# Patient Record
Sex: MALE | Race: WHITE | NOT HISPANIC OR LATINO | Employment: FULL TIME | URBAN - METROPOLITAN AREA
[De-identification: names, ages, dates, MRNs, and addresses within clinical notes are randomized per-mention and may not be internally consistent; named-entity substitution may affect disease eponyms.]

---

## 2017-01-20 ENCOUNTER — OFFICE VISIT (OUTPATIENT)
Dept: CARDIOLOGY | Facility: MEDICAL CENTER | Age: 62
End: 2017-01-20
Payer: COMMERCIAL

## 2017-01-20 VITALS
BODY MASS INDEX: 41.86 KG/M2 | HEART RATE: 64 BPM | WEIGHT: 299 LBS | OXYGEN SATURATION: 91 % | DIASTOLIC BLOOD PRESSURE: 80 MMHG | HEIGHT: 71 IN | SYSTOLIC BLOOD PRESSURE: 130 MMHG

## 2017-01-20 DIAGNOSIS — I25.84 CORONARY ARTERY DISEASE DUE TO CALCIFIED CORONARY LESION: ICD-10-CM

## 2017-01-20 DIAGNOSIS — I25.10 CORONARY ARTERY DISEASE DUE TO CALCIFIED CORONARY LESION: ICD-10-CM

## 2017-01-20 DIAGNOSIS — I10 ESSENTIAL HYPERTENSION, BENIGN: ICD-10-CM

## 2017-01-20 DIAGNOSIS — E78.2 MIXED HYPERLIPIDEMIA: ICD-10-CM

## 2017-01-20 PROCEDURE — 99214 OFFICE O/P EST MOD 30 MIN: CPT | Performed by: INTERNAL MEDICINE

## 2017-01-20 NOTE — PROGRESS NOTES
"Subjective:   Vitor Wilks is a 61 y.o. male who presents today for followup of his coronary artery disease with a drug-eluting stent placed in the circumflex for a non-ST segment elevation myocardial infarction in February 2014. He also has hypertension and hyperlipidemia.     His blood pressures at home and running approximately 120-130/70-80. He has had no chest discomfort, dyspnea, edema, palpitations or lightheadedness.    He's walking for about 30-45 minutes 3 days weekly without difficulty.          Past Medical History   Diagnosis Date   • Heart attack 02/17/     No past surgical history on file.  Family History   Problem Relation Age of Onset   • Stroke Father 60     likely TIA     History   Smoking status   • Former Smoker -- 0.50 packs/day for 10 years   • Types: Cigarettes   • Quit date: 02/28/2008   Smokeless tobacco   • Never Used     No Known Allergies  Outpatient Encounter Prescriptions as of 1/20/2017   Medication Sig Dispense Refill   • lisinopril (PRINIVIL) 5 MG Tab Take 1 Tab by mouth every day. 90 Tab 3   • rosuvastatin (CRESTOR) 40 MG tablet Take 1 Tab by mouth every day. 90 Tab 3   • metoprolol SR (TOPROL XL) 50 MG TABLET SR 24 HR Take 1 Tab by mouth every bedtime. 90 Tab 3   • aspirin (ASA) 325 MG TABS Take 325 mg by mouth every day.       No facility-administered encounter medications on file as of 1/20/2017.     ROS       Objective:   /80 mmHg  Pulse 64  Ht 1.803 m (5' 10.98\")  Wt 135.626 kg (299 lb)  BMI 41.72 kg/m2  SpO2 91%    Physical Exam   Neck: No JVD present.   Cardiovascular: Normal rate and regular rhythm.  Exam reveals no gallop.    No murmur heard.  Pulmonary/Chest: Effort normal. He has no rales.   Abdominal: Soft. There is no tenderness.   Musculoskeletal: He exhibits no edema.     Lab Results   Component Value Date/Time    CHOLESTEROL, 10/12/2016 08:48 AM    LDL 55 10/12/2016 08:48 AM    HDL 42 10/12/2016 08:48 AM    TRIGLYCERIDES 219* 10/12/2016 08:48 AM "       Lab Results   Component Value Date/Time    SODIUM 139 07/19/2016 09:58 AM    POTASSIUM 4.9 07/19/2016 09:58 AM    CHLORIDE 97 07/19/2016 09:58 AM    CO2 24 07/19/2016 09:58 AM    GLUCOSE 146* 07/19/2016 09:58 AM    BUN 15 07/19/2016 09:58 AM    CREATININE 0.78 07/19/2016 09:58 AM    BUN-CREATININE RATIO 19 07/19/2016 09:58 AM     Lab Results   Component Value Date/Time    ALKALINE PHOSPHATASE 60 10/12/2016 08:48 AM    AST(SGOT) 41* 10/12/2016 08:48 AM    ALT(SGPT) 57* 10/12/2016 08:48 AM    TOTAL BILIRUBIN 0.4 10/12/2016 08:48 AM      Lab Results   Component Value Date/Time    B NATRIURETIC PEPTIDE 15 02/17/2014 04:07 PM          Assessment:     1. Coronary artery disease due to calcified coronary lesion: MARYCARMEN to the circumflex in 2014     2. Mixed hyperlipidemia     3. Essential hypertension, benign         Medical Decision Making:  Today's Assessment / Status / Plan:     Mr. Wilks is clinically stable. He is asymptomatic from a cardiovascular standpoint. We did discuss increasing his aerobic conditioning. He does need to be at least mildly dyspneic when he goes for his walks. In addition, I would like him to get an activity tracker and walk at least 10,000 steps daily. We also discussed the fact that his BMI is in the morbidly obese range and we need to get that down. His blood pressure appears to be under excellent control as is his lipid status. He will follow-up in 6 months with repeat lab work. In addition, he plans to obtain lab work today.

## 2017-01-20 NOTE — MR AVS SNAPSHOT
"        Vitor Wilks   2017 7:45 AM   Office Visit   MRN: [id number]    Department:  [facility]   Dept Phone:  [phone]    Description:  Male : 1955   Provider:  Sai Alexandra M.D.           Reason for Visit     Follow-Up           Allergies as of 2017     No Known Allergies      You were diagnosed with     Coronary artery disease due to calcified coronary lesion   [6504530]       Mixed hyperlipidemia   [272.2.ICD-9-CM]       Essential hypertension, benign   [401.1.ICD-9-CM]         Vital Signs     Blood Pressure Pulse Height Weight Body Mass Index Oxygen Saturation    130/80 mmHg 64 1.803 m (5' 10.98\") 135.626 kg (299 lb) 41.72 kg/m2 91%    Smoking Status                   Former Smoker           Basic Information     Date Of Birth Sex Race Ethnicity Preferred Language    1955 Male White Non- English      Problem List              ICD-10-CM Priority Class Noted - Resolved    NSTEMI (non-ST elevated myocardial infarction) (CMS-[facility]) I21.4 High  2014 - Present    Coronary artery disease due to calcified coronary lesion: MARYCARMEN to the circumflex in  I25.10, I25.84   2014 - Present    Obesity E66.9   2014 - Present    Mixed hyperlipidemia E78.2   2014 - Present    S/P coronary artery stent placement Z95.5   2014 - Present    Essential hypertension, benign I10   2014 - Present    Research study patient Z00.6   2016 - Present      Health Maintenance        Date Due Completion Dates    IMM DTaP/Tdap/Td Vaccine (1 - Tdap) 10/5/1973 ---    COLONOSCOPY 10/5/2004 ---    IMM ZOSTER VACCINE 10/5/2014 ---    IMM INFLUENZA (1) 2016 ---            Current Immunizations     No immunizations on file.      Below and/or attached are the medications your provider expects you to take. Review all of your home medications and newly ordered medications with your provider and/or pharmacist. Follow medication instructions as directed by your provider " and/or pharmacist. Please keep your medication list with you and share with your provider. Update the information when medications are discontinued, doses are changed, or new medications (including over-the-counter products) are added; and carry medication information at all times in the event of emergency situations     Allergies:  No Known Allergies          Medications  Valid as of: January 20, 2017 -  8:45 AM    Generic Name Brand Name Tablet Size Instructions for use    Aspirin (Tab)  MG Take 325 mg by mouth every day.        Lisinopril (Tab) PRINIVIL 5 MG Take 1 Tab by mouth every day.        Metoprolol Succinate (TABLET SR 24 HR) TOPROL XL 50 MG Take 1 Tab by mouth every bedtime.        Rosuvastatin Calcium (Tab) CRESTOR 40 MG Take 1 Tab by mouth every day.        .                 Medicines prescribed today were sent to:     [facility] [address] [place] [address] [place] - [address] [facility]    [address] [facility] [address]    Phone: [phone] Fax: [phone]    Open 24 Hours?: No      Medication refill instructions:       If your prescription bottle indicates you have medication refills left, it is not necessary to call your provider’s office. Please contact your pharmacy and they will refill your medication.    If your prescription bottle indicates you do not have any refills left, you may request refills at any time through one of the following ways: The online [employer] system (except Urgent Care), by calling your provider’s office, or by asking your pharmacy to contact your provider’s office with a refill request. Medication refills are processed only during regular business hours and may not be available until the next business day. Your provider may request additional information or to have a follow-up visit with you prior to refilling your medication.   *Please Note: Medication refills are assigned a new Rx number when refilled electronically. Your pharmacy may indicate that no  refills were authorized even though a new prescription for the same medication is available at the pharmacy. Please request the medicine by name with the pharmacy before contacting your provider for a refill.        Your To Do List     Future Labs/Procedures Complete By Expires    COMP METABOLIC PANEL  As directed 1/20/2018    LIPID PROFILE  As directed 1/20/2018         [employer] Access Code: [email or web address]  Expires: 2/9/2017 10:39 AM    [employer]  A secure, online tool to manage your health information     [employer]’s [employer]® is a secure, online tool that connects you to your personalized health information from the privacy of your home -- day or night - making it very easy for you to manage your healthcare. Once the activation process is completed, you can even access your medical information using the [employer] giovanni, which is available for free in the Apple Giovanni store or Google Play store.     [employer] provides the following levels of access (as shown below):   My Chart Features   [facility] Primary Care Doctor [facility]  Specialists [facility]  Urgent  [facility]  Primary Care  Doctor   Email your healthcare team securely and privately 24/7 X X X    Manage appointments: schedule your next appointment; view details of past/upcoming appointments X      Request prescription refills. X      View recent personal medical records, including lab and immunizations X X X X   View health record, including health history, allergies, medications X X X X   Read reports about your outpatient visits, procedures, consult and ER notes X X X X   See your discharge summary, which is a recap of your hospital and/or ER visit that includes your diagnosis, lab results, and care plan. X X       How to register for [employer]:  1. Go to  [email or web address].  2. Click on the Sign Up Now box, which takes you to the New Member Sign Up page. You will need to provide the following information:  a. Enter your [employer] Access Code exactly as it  appears at the top of this page. (You will not need to use this code after you’ve completed the sign-up process. If you do not sign up before the expiration date, you must request a new code.)   b. Enter your date of birth.   c. Enter your home email address.   d. Click Submit, and follow the next screen’s instructions.  3. Create a [employer] ID. This will be your [employer] login ID and cannot be changed, so think of one that is secure and easy to remember.  4. Create a [employer] password. You can change your password at any time.  5. Enter your Password Reset Question and Answer. This can be used at a later time if you forget your password.   6. Enter your e-mail address. This allows you to receive e-mail notifications when new information is available in [employer].  7. Click Sign Up. You can now view your health information.    For assistance activating your [employer] account, call [phone]

## 2017-01-20 NOTE — Clinical Note
"     [facility]   [address]  [place] [address]  Phone: [phone]  Fax: [phone]              Vitor Wilks  1955    Encounter Date: 1/20/2017    Sai Alexandra M.D.          PROGRESS NOTE:  Subjective:   Vitor Wilks is a 61 y.o. male who presents today for followup of his coronary artery disease with a drug-eluting stent placed in the circumflex for a non-ST segment elevation myocardial infarction in February 2014. He also has hypertension and hyperlipidemia.     His blood pressures at home and running approximately 120-130/70-80. He has had no chest discomfort, dyspnea, edema, palpitations or lightheadedness.    He's walking for about 30-45 minutes 3 days weekly without difficulty.          Past Medical History   Diagnosis Date   • Heart attack 02/17/     No past surgical history on file.  Family History   Problem Relation Age of Onset   • Stroke Father 60     likely TIA     History   Smoking status   • Former Smoker -- 0.50 packs/day for 10 years   • Types: Cigarettes   • Quit date: 02/28/2008   Smokeless tobacco   • Never Used     No Known Allergies  Outpatient Encounter Prescriptions as of 1/20/2017   Medication Sig Dispense Refill   • lisinopril (PRINIVIL) 5 MG Tab Take 1 Tab by mouth every day. 90 Tab 3   • rosuvastatin (CRESTOR) 40 MG tablet Take 1 Tab by mouth every day. 90 Tab 3   • metoprolol SR (TOPROL XL) 50 MG TABLET SR 24 HR Take 1 Tab by mouth every bedtime. 90 Tab 3   • aspirin (ASA) 325 MG TABS Take 325 mg by mouth every day.       No facility-administered encounter medications on file as of 1/20/2017.     ROS       Objective:   /80 mmHg  Pulse 64  Ht 1.803 m (5' 10.98\")  Wt 135.626 kg (299 lb)  BMI 41.72 kg/m2  SpO2 91%    Physical Exam   Neck: No JVD present.   Cardiovascular: Normal rate and regular rhythm.  Exam reveals no gallop.    No murmur heard.  Pulmonary/Chest: Effort normal. He has no rales.  "   Abdominal: Soft. There is no tenderness.   Musculoskeletal: He exhibits no edema.     Lab Results   Component Value Date/Time    CHOLESTEROL, 10/12/2016 08:48 AM    LDL 55 10/12/2016 08:48 AM    HDL 42 10/12/2016 08:48 AM    TRIGLYCERIDES 219* 10/12/2016 08:48 AM       Lab Results   Component Value Date/Time    SODIUM 139 07/19/2016 09:58 AM    POTASSIUM 4.9 07/19/2016 09:58 AM    CHLORIDE 97 07/19/2016 09:58 AM    CO2 24 07/19/2016 09:58 AM    GLUCOSE 146* 07/19/2016 09:58 AM    BUN 15 07/19/2016 09:58 AM    CREATININE 0.78 07/19/2016 09:58 AM    BUN-CREATININE RATIO 19 07/19/2016 09:58 AM     Lab Results   Component Value Date/Time    ALKALINE PHOSPHATASE 60 10/12/2016 08:48 AM    AST(SGOT) 41* 10/12/2016 08:48 AM    ALT(SGPT) 57* 10/12/2016 08:48 AM    TOTAL BILIRUBIN 0.4 10/12/2016 08:48 AM      Lab Results   Component Value Date/Time    B NATRIURETIC PEPTIDE 15 02/17/2014 04:07 PM          Assessment:     1. Coronary artery disease due to calcified coronary lesion: MARYCARMEN to the circumflex in 2014     2. Mixed hyperlipidemia     3. Essential hypertension, benign         Medical Decision Making:  Today's Assessment / Status / Plan:     Mr. Wilks is clinically stable. He is asymptomatic from a cardiovascular standpoint. We did discuss increasing his aerobic conditioning. He does need to be at least mildly dyspneic when he goes for his walks. In addition, I would like him to get an activity tracker and walk at least 10,000 steps daily. We also discussed the fact that his BMI is in the morbidly obese range and we need to get that down. His blood pressure appears to be under excellent control as is his lipid status. He will follow-up in 6 months with repeat lab work. In addition, he plans to obtain lab work today.      Jose Miguel Evans M.D.  [address]  [address]  [facility] [address]  VIA In Basket

## 2017-01-21 LAB
ALBUMIN SERPL-MCNC: 4.4 G/DL (ref 3.6–4.8)
ALBUMIN/GLOB SERPL: 1.8 {RATIO} (ref 1.1–2.5)
ALP SERPL-CCNC: 56 IU/L (ref 39–117)
ALT SERPL-CCNC: 34 IU/L (ref 0–44)
AST SERPL-CCNC: 26 IU/L (ref 0–40)
BILIRUB SERPL-MCNC: 0.6 MG/DL (ref 0–1.2)
BUN SERPL-MCNC: 14 MG/DL (ref 8–27)
BUN/CREAT SERPL: 15 (ref 10–22)
CALCIUM SERPL-MCNC: 9.3 MG/DL (ref 8.6–10.2)
CHLORIDE SERPL-SCNC: 101 MMOL/L (ref 96–106)
CHOLEST SERPL-MCNC: 120 MG/DL (ref 100–199)
CO2 SERPL-SCNC: 20 MMOL/L (ref 18–29)
COMMENT 011824: NORMAL
CREAT SERPL-MCNC: 0.91 MG/DL (ref 0.76–1.27)
GLOBULIN SER CALC-MCNC: 2.5 G/DL (ref 1.5–4.5)
GLUCOSE SERPL-MCNC: 208 MG/DL (ref 65–99)
HDLC SERPL-MCNC: 43 MG/DL
LDLC SERPL CALC-MCNC: 50 MG/DL (ref 0–99)
POTASSIUM SERPL-SCNC: 4.6 MMOL/L (ref 3.5–5.2)
PROT SERPL-MCNC: 6.9 G/DL (ref 6–8.5)
SODIUM SERPL-SCNC: 138 MMOL/L (ref 134–144)
TRIGL SERPL-MCNC: 133 MG/DL (ref 0–149)
VLDLC SERPL CALC-MCNC: 27 MG/DL (ref 5–40)

## 2017-01-24 ENCOUNTER — TELEPHONE (OUTPATIENT)
Dept: CARDIOLOGY | Facility: MEDICAL CENTER | Age: 62
End: 2017-01-24

## 2017-01-24 DIAGNOSIS — I10 ESSENTIAL HYPERTENSION: ICD-10-CM

## 2017-01-24 DIAGNOSIS — I25.2 HISTORY OF NON-ST ELEVATION MYOCARDIAL INFARCTION (NSTEMI): ICD-10-CM

## 2017-01-24 RX ORDER — METOPROLOL SUCCINATE 50 MG/1
50 TABLET, EXTENDED RELEASE ORAL
Qty: 90 TAB | Refills: 3 | OUTPATIENT
Start: 2017-01-24 | End: 2018-01-29 | Stop reason: SDUPTHER

## 2017-01-24 NOTE — TELEPHONE ENCOUNTER
Patient was scheduled to come in for lab work for the Cirt Study on 1/23/17 @0900. Patient no showed. I called patient 1/24/17 @

## 2017-01-27 ENCOUNTER — NON-PROVIDER VISIT (OUTPATIENT)
Dept: VASCULAR LAB | Facility: MEDICAL CENTER | Age: 62
End: 2017-01-27

## 2017-01-27 NOTE — NON-PROVIDER
Saw Joseph today for the CIRT study pre visit 4.7 lab draw. Labs were drawn and processed according to protocol. I gave him the participant fall 2016 newsletter. Next in office study visit with fasting lab and medication dispense is scheduled for 2/13/2017 at 0800. I will call him next week after we receive his safety labs and go over study questions.    Patient mentioned at this visit when he took his first open label MTX he had an upset stomach with it. When he took the 2 tablets the next Saturday he was fine and has not had any issues with it since that first dose. We have asked at every visit how he was doing and if he was having any side effects or any problems with taking the study drug, he has always said no. I told him if he ever has any problems or symptoms with taking the study drug to let us know right away. He is not having any problems, up set stomach or other issues as of today. He said it was just that one time.    The visit window for this visit was 1/10/17 to 1/24/17.This visit is outside of the visit window as he had to reschedule the appt on 1/18/17 and didn't show for appt 1/23/17.

## 2017-02-07 ENCOUNTER — TELEPHONE (OUTPATIENT)
Dept: CARDIOLOGY | Facility: MEDICAL CENTER | Age: 62
End: 2017-02-07

## 2017-02-07 NOTE — TELEPHONE ENCOUNTER
----- Message from Sai Alexandra M.D. sent at 2/6/2017  6:08 AM PST -----  Needs fu with PCP if pt was fasting at time of lab. Increased BS.

## 2017-02-07 NOTE — TELEPHONE ENCOUNTER
Called patient, he states that he was fasting for his lab draw. He is aware of his lab results and has already scheduled an appointment with SANGITA GARZA on 3/24/2017 to discuss his elevated blood glucose.    ROYCE HURT

## 2017-02-13 ENCOUNTER — TELEPHONE (OUTPATIENT)
Dept: ADMINISTRATIVE | Facility: MEDICAL CENTER | Age: 62
End: 2017-02-13

## 2017-02-13 NOTE — TELEPHONE ENCOUNTER
Patient in for CIRT study Visit 5, fasting lab, BP and weight.  Protocol assessments completed and study drug usage reviewed.  Labs processed per protocol. New study medication pack dispensed.  Joseph had no complaints or problems with study drug or compliance.  Once safety labs received, I will call him to complete study visit.  Next appointment made for 3/13/17.

## 2017-03-20 ENCOUNTER — NON-PROVIDER VISIT (OUTPATIENT)
Dept: VASCULAR LAB | Facility: MEDICAL CENTER | Age: 62
End: 2017-03-20

## 2017-03-20 NOTE — NON-PROVIDER
[facility] Blood was drawn and processed according to protocol. Study questions were answered, eCRF was filled out. I will call Joseph in 2-3 days when safety labs are completed and titration algorithm will be run at that time. He is currently on 20 mg of study drug every Saturday. He has had no problems with taking the SD. No upcoming procedures are scheduled, no hospitalizations and no new diagnoses. Next blood draw is scheduled for April 10 at 0900.

## 2017-03-22 PROBLEM — Z00.6 ENCOUNTER FOR EXAMINATION FOR NORMAL COMPARISON AND CONTROL IN CLINICAL RESEARCH PROGRAM: Status: ACTIVE | Noted: 2017-03-22

## 2017-03-24 ENCOUNTER — OFFICE VISIT (OUTPATIENT)
Dept: MEDICAL GROUP | Facility: PHYSICIAN GROUP | Age: 62
End: 2017-03-24
Payer: COMMERCIAL

## 2017-03-24 VITALS
BODY MASS INDEX: 41.3 KG/M2 | SYSTOLIC BLOOD PRESSURE: 122 MMHG | DIASTOLIC BLOOD PRESSURE: 80 MMHG | OXYGEN SATURATION: 94 % | HEIGHT: 71 IN | WEIGHT: 295 LBS | TEMPERATURE: 97.9 F | HEART RATE: 67 BPM | RESPIRATION RATE: 16 BRPM

## 2017-03-24 DIAGNOSIS — Z00.00 HEALTHCARE MAINTENANCE: ICD-10-CM

## 2017-03-24 DIAGNOSIS — R73.09 ELEVATED GLUCOSE: ICD-10-CM

## 2017-03-24 DIAGNOSIS — Z00.6 RESEARCH STUDY PATIENT: ICD-10-CM

## 2017-03-24 DIAGNOSIS — I21.4 NSTEMI (NON-ST ELEVATED MYOCARDIAL INFARCTION) (HCC): ICD-10-CM

## 2017-03-24 DIAGNOSIS — Z12.11 SCREEN FOR COLON CANCER: ICD-10-CM

## 2017-03-24 DIAGNOSIS — E78.2 MIXED HYPERLIPIDEMIA: ICD-10-CM

## 2017-03-24 DIAGNOSIS — I10 ESSENTIAL HYPERTENSION, BENIGN: ICD-10-CM

## 2017-03-24 DIAGNOSIS — Z95.5 S/P CORONARY ARTERY STENT PLACEMENT: ICD-10-CM

## 2017-03-24 PROCEDURE — 99214 OFFICE O/P EST MOD 30 MIN: CPT | Performed by: NURSE PRACTITIONER

## 2017-03-24 ASSESSMENT — PATIENT HEALTH QUESTIONNAIRE - PHQ9: CLINICAL INTERPRETATION OF PHQ2 SCORE: 0

## 2017-03-24 ASSESSMENT — PAIN SCALES - GENERAL: PAINLEVEL: NO PAIN

## 2017-03-24 NOTE — ASSESSMENT & PLAN NOTE
As noted this is a 61-year-old male who is here to establish care, he has known coronary artery disease and status post stent placement in 2014. This is managed by his cardiologist Dr. Alexandra. He has no chest pain symptoms.

## 2017-03-24 NOTE — ASSESSMENT & PLAN NOTE
As noted patient is a current research study participant under the direction of Dr. Bailey. This is a randomized, double-blind, placebo-controlled trial of low-dose methotrexate and the prevention of cardiovascular events among stable coronary artery disease patients with type 2 diabetes or metabolic syndrome. In addition to methotrexate  Vs placebo he takes folic acid daily. He follows up with the research team monthly with labs.

## 2017-03-24 NOTE — ASSESSMENT & PLAN NOTE
This is a 61-year-old male with known coronary artery disease, history is post coronary artery stent placement in 2014. He is currently followed closely by cardiology who manages his lisinopril, metoprolol, Crestor. Most recent lipid panel was normal and reviewed with the patient. He did have a fasting CMP which showed a glucose in the 200s, he was directed to PCP for follow-up and management.

## 2017-03-24 NOTE — ASSESSMENT & PLAN NOTE
Patient is due for colonoscopy, we discussed colonoscopy versus FIT, patient prefers FIT. He does not have family history of colon cancer, he denies rectal pain, hematochezia, rectal bleeding.     As noted patient is a double-blind research study participant, it is unknown if he is on methotrexate or placebo, he is not a candidate for shingles vaccine at this time.

## 2017-03-24 NOTE — ASSESSMENT & PLAN NOTE
As noted this is a 61-year-old male with known coronary artery disease, on recent fasting labs his glucose was elevated in the 200s and he was asked to follow-up with PCP for diagnosis and management. He did test a fasting glucose this morning and he tells me that it was in the mid 200s but he cannot recall the specific breathing. He denies polyuria, polydipsia, polyphagia. He is morbidly obese. He does have a family history of diabetes. We discussed A1c and starting metformin 500 mg twice daily with meals, we discussed the adverse effects this medication. He will follow up in 3 months, sooner if needed.

## 2017-03-24 NOTE — ASSESSMENT & PLAN NOTE
This is a chronic condition which is well controlled on medications. Patient is tolerating medications without side effects.  Patient continues to follow up with cardiology.

## 2017-03-24 NOTE — PROGRESS NOTES
[facility]  Primary Care Office Visit - Problem-Oriented        History:     Vitor Wilks is a 61 y.o. male who is here today to discuss New Patient    S/P coronary artery stent placement  This is a 61-year-old male with known coronary artery disease, history is post coronary artery stent placement in 2014. He is currently followed closely by cardiology who manages his lisinopril, metoprolol, Crestor. Most recent lipid panel was normal and reviewed with the patient. He did have a fasting CMP which showed a glucose in the 200s, he was directed to PCP for follow-up and management.    Elevated glucose  As noted this is a 61-year-old male with known coronary artery disease, on recent fasting labs his glucose was elevated in the 200s and he was asked to follow-up with PCP for diagnosis and management. He did test a fasting glucose this morning and he tells me that it was in the mid 200s but he cannot recall the specific breathing. He denies polyuria, polydipsia, polyphagia. He is morbidly obese. He does have a family history of diabetes. We discussed A1c and starting metformin 500 mg twice daily with meals, we discussed the adverse effects this medication. He will follow up in 3 months, sooner if needed.       NSTEMI (non-ST elevated myocardial infarction)  As noted this is a 61-year-old male who is here to establish care, he has known coronary artery disease and status post stent placement in 2014. This is managed by his cardiologist Dr. Alexandra. He has no chest pain symptoms.    Essential hypertension, benign  This is a chronic condition which is well controlled on medications. Patient is tolerating medications without side effects.  Patient continues to follow up with cardiology.      Mixed hyperlipidemia  This is a chronic condition which is well controlled on medications. Patient is tolerating medications without side effects.      Research study patient  As noted patient is a current research  study participant under the direction of Dr. Bailey. This is a randomized, double-blind, placebo-controlled trial of low-dose methotrexate and the prevention of cardiovascular events among stable coronary artery disease patients with type 2 diabetes or metabolic syndrome. In addition to methotrexate  Vs placebo he takes folic acid daily. He follows up with the research team monthly with labs.      Healthcare maintenance  Patient is due for colonoscopy, we discussed colonoscopy versus FIT, patient prefers FIT. He does not have family history of colon cancer, he denies rectal pain, hematochezia, rectal bleeding.     As noted patient is a double-blind research study participant, it is unknown if he is on methotrexate or placebo, he is not a candidate for shingles vaccine at this time.              Past Medical History   Diagnosis Date   • Heart attack (CMS-HCC) 02/17/     History reviewed. No pertinent past surgical history.  Social History     Social History   • Marital Status:      Spouse Name: N/A   • Number of Children: N/A   • Years of Education: N/A     Occupational History   • Not on file.     Social History Main Topics   • Smoking status: Former Smoker -- 0.50 packs/day for 10 years     Types: Cigarettes     Quit date: 02/28/2008   • Smokeless tobacco: Never Used   • Alcohol Use: No   • Drug Use: No   • Sexual Activity: Not on file     Other Topics Concern   • Not on file     Social History Narrative     History   Smoking status   • Former Smoker -- 0.50 packs/day for 10 years   • Types: Cigarettes   • Quit date: 02/28/2008   Smokeless tobacco   • Never Used     Family History   Problem Relation Age of Onset   • Stroke Father 60     likely TIA     No Known Allergies    Problem List:     Patient Active Problem List    Diagnosis Date Noted   • NSTEMI (non-ST elevated myocardial infarction) (CMS-HCC) 02/18/2014     Priority: High   • Elevated glucose 03/24/2017   • Healthcare maintenance 03/24/2017  "  • Encounter for examination for normal comparison and control in clinical research program 03/22/2017   • Research study patient 07/29/2016   • Mixed hyperlipidemia 02/28/2014   • S/P coronary artery stent placement 02/28/2014   • Essential hypertension, benign 02/28/2014   • Coronary artery disease due to calcified coronary lesion: MARYCARMEN to the circumflex in 2014 02/18/2014   • Obesity 02/18/2014         Medications:     Current outpatient prescriptions:   •  METHOTREXATE, ANTI-RHEUMATIC, PO, Take  by mouth., Disp: , Rfl:   •  metformin (GLUCOPHAGE) 500 MG Tab, Take 1 Tab by mouth 2 times a day, with meals., Disp: 60 Tab, Rfl: 2  •  metoprolol SR (TOPROL XL) 50 MG TABLET SR 24 HR, Take 1 Tab by mouth every bedtime., Disp: 90 Tab, Rfl: 3  •  lisinopril (PRINIVIL) 5 MG Tab, Take 1 Tab by mouth every day., Disp: 90 Tab, Rfl: 3  •  rosuvastatin (CRESTOR) 40 MG tablet, Take 1 Tab by mouth every day., Disp: 90 Tab, Rfl: 3  •  aspirin (ASA) 325 MG TABS, Take 325 mg by mouth every day., Disp: , Rfl:       Review of Systems:     Comprehensive review of systems negative.      Physical Assessment:     VS: /80 mmHg  Pulse 67  Temp(Src) 36.6 °C (97.9 °F)  Resp 16  Ht 1.803 m (5' 10.98\")  Wt 133.811 kg (295 lb)  BMI 41.16 kg/m2  SpO2 94%    General: Well-developed, well-nourished, male     Head: PERRL, EOMI. Normocephalic. No facial asymmetry noted.  Cardiovasc:No JVD.  RRR, no MRG. No thrills or bruits. Pulses 2+ and symmetric at all distal extremities.  Pulmonary: Lungs clear bilaterally.  Normal respiratory effort. No wheeze or crackles.   Extremities: No edema, no TTP bilateral calves. Pedal pulses intact. No joint effusions. LEs warm and well-perfused.  Neuro: Alert and oriented  Skin:No rashes noted. Skin warm, dry, intact    Psych: Dressed appropriately for the weather, pleasant and conversant.  Affect, mood & judgment appropriate.      Assessment/Plan:   Vitor was seen today for new patient.    Diagnoses " and all orders for this visit:    Elevated glucose, new, uncontrolled   - We'll evaluate for diabetes with A1c, given recurrent elevated fasting glucose readings will start metformin 500 mg twice daily with meals. We discussed adverse effect this medication. We discussed diet and exercise. He will follow up in 3 months, sooner if needed.  -     HEMOGLOBIN A1C; Future  -     metformin (GLUCOPHAGE) 500 MG Tab; Take 1 Tab by mouth 2 times a day, with meals.    Screen for colon cancer  -     OCCULT BLOOD FECES IMMUNOASSAY; Future    Essential hypertension, benign, chronic, stable   - managed by cardiology, patient continues on lisinopril and metoprolol.    Mixed hyperlipidemia, chronic, stable on crestor    Research study patient    Patient is agreeable to the above plan and voiced understanding. All questions answered.     Please note that this dictation was created using voice recognition software. I have made every reasonable attempt to correct obvious errors, but I expect that there are errors of grammar and possibly content that I did not discover before finalizing the note.      KARINA Mccoy  3/24/2017, 10:03 AM

## 2017-03-24 NOTE — MR AVS SNAPSHOT
"        Vitor Wilks   3/24/2017 9:40 AM   Office Visit   MRN: [id number]    Department:  [facility]   Dept Phone:  [phone]    Description:  Male : 1955   Provider:  LAY Davis           Reason for Visit     New Patient est care and glucose.       Allergies as of 3/24/2017     No Known Allergies      You were diagnosed with     Elevated glucose   [303032]       NSTEMI (non-ST elevated myocardial infarction) (CMS-[facility])   [697926]       Healthcare maintenance   [451631]       Screen for colon cancer   [957507]         Vital Signs     Blood Pressure Pulse Temperature Respirations Height Weight    122/80 mmHg 67 36.6 °C (97.9 °F) 16 1.803 m (5' 10.98\") 133.811 kg (295 lb)    Body Mass Index Oxygen Saturation Smoking Status             41.16 kg/m2 94% Former Smoker         Basic Information     Date Of Birth Sex Race Ethnicity Preferred Language    1955 Male White Non- English      Your appointments     Apr 10, 2017  9:00 AM   FOLLOW UP VISIT  - RESEARCH with CARDIOVASCULAR RESEARCH NURSE   [facility]  (--)    [address] [place] [address]   [phone]            2017  9:00 AM   Established Patient with LAY Davis   [address] [address] [phone]           You will be receiving a confirmation call a few days before your appointment from our automated call confirmation system.            2017  8:15 AM   FOLLOW UP with Sai Alexandra M.D.   [facility] (--)    [address]  [place] [address] [phone]              Problem List              ICD-10-CM Priority Class Noted - Resolved    NSTEMI (non-ST elevated myocardial infarction) (CMS-[facility]) I21.4 High  2014 - Present    Coronary artery disease due to calcified coronary lesion: MARYCARMEN to the " circumflex in 2014 I25.10, I25.84   2/18/2014 - Present    Obesity E66.9   2/18/2014 - Present    Mixed hyperlipidemia E78.2   2/28/2014 - Present    S/P coronary artery stent placement Z95.5   2/28/2014 - Present    Essential hypertension, benign I10   2/28/2014 - Present    Research study patient Z00.6   7/29/2016 - Present    Encounter for examination for normal comparison and control in clinical research program Z00.6   3/22/2017 - Present    Elevated glucose R73.09   3/24/2017 - Present    Healthcare maintenance Z00.00   3/24/2017 - Present      Health Maintenance        Date Due Completion Dates    IMM DTaP/Tdap/Td Vaccine (1 - Tdap) 10/5/1973 ---    COLONOSCOPY 10/5/2004 ---    IMM ZOSTER VACCINE 10/5/2014 ---    IMM INFLUENZA (1) 9/1/2016 ---            Current Immunizations     No immunizations on file.      Below and/or attached are the medications your provider expects you to take. Review all of your home medications and newly ordered medications with your provider and/or pharmacist. Follow medication instructions as directed by your provider and/or pharmacist. Please keep your medication list with you and share with your provider. Update the information when medications are discontinued, doses are changed, or new medications (including over-the-counter products) are added; and carry medication information at all times in the event of emergency situations     Allergies:  No Known Allergies          Medications  Valid as of: March 24, 2017 -  9:57 AM    Generic Name Brand Name Tablet Size Instructions for use    Aspirin (Tab)  MG Take 325 mg by mouth every day.        Lisinopril (Tab) PRINIVIL 5 MG Take 1 Tab by mouth every day.        MetFORMIN HCl (Tab) GLUCOPHAGE 500 MG Take 1 Tab by mouth 2 times a day, with meals.        Methotrexate (Anti-Rheumatic)   Take  by mouth.        Metoprolol Succinate (TABLET SR 24 HR) TOPROL XL 50 MG Take 1 Tab by mouth every bedtime.        Rosuvastatin Calcium  (Tab) CRESTOR 40 MG Take 1 Tab by mouth every day.        .                 Medicines prescribed today were sent to:     [facility] [place], [place] - [address] [address]    [address] [place] [address]    Phone: [phone] Fax: [phone]    Open 24 Hours?: No      Medication refill instructions:       If your prescription bottle indicates you have medication refills left, it is not necessary to call your provider’s office. Please contact your pharmacy and they will refill your medication.    If your prescription bottle indicates you do not have any refills left, you may request refills at any time through one of the following ways: The online Emgo system (except Urgent Care), by calling your provider’s office, or by asking your pharmacy to contact your provider’s office with a refill request. Medication refills are processed only during regular business hours and may not be available until the next business day. Your provider may request additional information or to have a follow-up visit with you prior to refilling your medication.   *Please Note: Medication refills are assigned a new Rx number when refilled electronically. Your pharmacy may indicate that no refills were authorized even though a new prescription for the same medication is available at the pharmacy. Please request the medicine by name with the pharmacy before contacting your provider for a refill.        Your To Do List     Future Labs/Procedures Complete By Expires    HEMOGLOBIN A1C  As directed 3/25/2018    OCCULT BLOOD FECES IMMUNOASSAY  As directed 3/25/2018         Emgo Access Code: Activation code not generated  Current Emgo Status: Active

## 2017-04-10 ENCOUNTER — NON-PROVIDER VISIT (OUTPATIENT)
Dept: VASCULAR LAB | Facility: MEDICAL CENTER | Age: 62
End: 2017-04-10

## 2017-04-10 NOTE — NON-PROVIDER
Saw Joseph for the CIRT study pre-visit 5.6. We margaret lab work and processed according to protocol. He is currently on 4 tablets of SD every Saturday. He is not taking extra folic acid. I will call him when safety labs are received in 2-3 days, go through study questions at that time and run titration algorithm.

## 2017-04-15 ENCOUNTER — HOSPITAL ENCOUNTER (OUTPATIENT)
Facility: MEDICAL CENTER | Age: 62
End: 2017-04-15
Attending: NURSE PRACTITIONER
Payer: COMMERCIAL

## 2017-04-15 PROCEDURE — 82274 ASSAY TEST FOR BLOOD FECAL: CPT

## 2017-04-19 ENCOUNTER — TELEPHONE (OUTPATIENT)
Dept: MEDICAL GROUP | Facility: PHYSICIAN GROUP | Age: 62
End: 2017-04-19

## 2017-04-19 DIAGNOSIS — E11.9 TYPE 2 DIABETES MELLITUS WITHOUT COMPLICATION, WITHOUT LONG-TERM CURRENT USE OF INSULIN (HCC): ICD-10-CM

## 2017-04-19 DIAGNOSIS — Z12.11 SCREEN FOR COLON CANCER: ICD-10-CM

## 2017-04-19 LAB — HBA1C MFR BLD: 8.4 % (ref 4.8–5.6)

## 2017-04-19 NOTE — TELEPHONE ENCOUNTER
Please call the patient. He needs to increase his metformin to 1000mg (2 tabs) twice daily with meals and start a new medication once daily with breakfast for A1c 8.4%. We will repeat A1c in mid July. Keep follow up. Rx sent to pharmacy, updated metformin Rx also at pharmacy.

## 2017-04-20 LAB — HEMOCCULT STL QL IA: NEGATIVE

## 2017-06-02 ENCOUNTER — OFFICE VISIT (OUTPATIENT)
Dept: URGENT CARE | Facility: PHYSICIAN GROUP | Age: 62
End: 2017-06-02
Payer: COMMERCIAL

## 2017-06-02 VITALS
WEIGHT: 293 LBS | OXYGEN SATURATION: 98 % | SYSTOLIC BLOOD PRESSURE: 124 MMHG | TEMPERATURE: 96.7 F | RESPIRATION RATE: 14 BRPM | BODY MASS INDEX: 40.88 KG/M2 | HEART RATE: 86 BPM | DIASTOLIC BLOOD PRESSURE: 82 MMHG

## 2017-06-02 DIAGNOSIS — J20.9 ACUTE BRONCHITIS, UNSPECIFIED ORGANISM: ICD-10-CM

## 2017-06-02 DIAGNOSIS — J02.9 PHARYNGITIS, UNSPECIFIED ETIOLOGY: ICD-10-CM

## 2017-06-02 DIAGNOSIS — I10 ESSENTIAL HYPERTENSION: ICD-10-CM

## 2017-06-02 DIAGNOSIS — E11.9 TYPE 2 DIABETES MELLITUS WITHOUT COMPLICATION, WITHOUT LONG-TERM CURRENT USE OF INSULIN (HCC): ICD-10-CM

## 2017-06-02 PROCEDURE — 99214 OFFICE O/P EST MOD 30 MIN: CPT | Performed by: FAMILY MEDICINE

## 2017-06-02 RX ORDER — AMOXICILLIN AND CLAVULANATE POTASSIUM 875; 125 MG/1; MG/1
1 TABLET, FILM COATED ORAL 2 TIMES DAILY
Qty: 20 TAB | Refills: 0 | Status: SHIPPED | OUTPATIENT
Start: 2017-06-02 | End: 2017-06-12

## 2017-06-02 NOTE — PROGRESS NOTES
HPI: Vitor Wilks is a 61 y.o. male who presents with   Chief Complaint   Patient presents with   • URI     Cold Sx      Patient has had sore throat nasal congestion and a cough for several days. He denies fevers or chills his had some fatigue. No nausea vomiting or diarrhea. He is a diabetic his physicians are trying the works to improve his levels. He states that he took a fingerstick blood sugar yesterday which was 140.  Worsened by: activity, laying supine at night, first thing in the morning, when exposed to outside allergens  Improved by: OTC symptomatic medictions      PMH:  has a past medical history of Heart attack (CMS-[facility]) (02/17/).  MEDS:   Current outpatient prescriptions:   •  metformin (GLUCOPHAGE) 1000 MG tablet, Take 1 Tab by mouth 2 times a day, with meals., Disp: 60 Tab, Rfl: 11  •  METHOTREXATE, ANTI-RHEUMATIC, PO, Take  by mouth., Disp: , Rfl:   •  metoprolol SR (TOPROL XL) 50 MG TABLET SR 24 HR, Take 1 Tab by mouth every bedtime., Disp: 90 Tab, Rfl: 3  •  lisinopril (PRINIVIL) 5 MG Tab, Take 1 Tab by mouth every day., Disp: 90 Tab, Rfl: 3  •  rosuvastatin (CRESTOR) 40 MG tablet, Take 1 Tab by mouth every day., Disp: 90 Tab, Rfl: 3  •  aspirin (ASA) 325 MG TABS, Take 325 mg by mouth every day., Disp: , Rfl:   •  sitagliptin (JANUVIA) 100 MG Tab, Take 1 Tab by mouth every day., Disp: 30 Tab, Rfl: 11  ALLERGIES: No Known Allergies  SURGHX: History reviewed. No pertinent past surgical history.  SOCHX:  reports that he quit smoking about 9 years ago. His smoking use included Cigarettes. He has a 5 pack-year smoking history. He has never used smokeless tobacco. He reports that he does not drink alcohol or use illicit drugs.  FH: Family history was reviewed, no pertinent findings to report    PE:  Vitals /82 mmHg  Pulse 86  Temp(Src) 35.9 °C (96.7 °F)  Resp 14  Wt 132.904 kg (293 lb)  SpO2 98%   Gen AOx4, NAD  HEENT: moist mucus membranes, no pain or pressure with percussion of  frontal, maxillary or ethmoid sinuses.  Bilateral conjunciva clear without erythema or exudate,  Right tm with erythema, left tm clear without bulge, fluid or loss of landmarks, mild pharyngeal erythema without tonsillar exudate or tonsillar enlargement  Neck: supple, no cervical lymphadenopathy, no signs of menigismus  CV/PULM: RRR no murmurs, no rales but ronchi and expiratory wheezes are present, no signs of resp distress  Abd soft nontender, bs present  Skin no rashes  Extremities -c/c/e  Neuro appropriate affect,     A/P  1. Acute bronchitis, unspecified organism  amoxicillin-clavulanate (AUGMENTIN) 875-125 MG Tab   2. Pharyngitis, unspecified etiology  amoxicillin-clavulanate (AUGMENTIN) 875-125 MG Tab   3. Type 2 diabetes mellitus without complication, without long-term current use of insulin (CMS-[facility])     4. Essential hypertension       Follow-up with primary care provider within 4-5 days, emergency room precautions discussed.  Patient and/or family appears understanding of information.

## 2017-06-02 NOTE — MR AVS SNAPSHOT
Vitor Wilks   2017 9:40 AM   Office Visit   MRN: [id number]    Department:  [facility]   Dept Phone:  [phone]    Description:  Male : 1955   Provider:  Lou uRtledge D.O.           Reason for Visit     URI Cold Sx      Allergies as of 2017     No Known Allergies      You were diagnosed with     Acute bronchitis, unspecified organism   [8641592]       Pharyngitis, unspecified etiology   [3399822]       Type 2 diabetes mellitus without complication, without long-term current use of insulin (CMS-[facility])   [0170662]       Essential hypertension   [9149982]         Vital Signs     Blood Pressure Pulse Temperature Respirations Weight Oxygen Saturation    124/82 mmHg 86 35.9 °C (96.7 °F) 14 132.904 kg (293 lb) 98%    Smoking Status                   Former Smoker           Basic Information     Date Of Birth Sex Race Ethnicity Preferred Language    1955 Male White Non- English      Your appointments     2017  8:00 AM   FOLLOW UP VISIT  - RESEARCH with CARDIOVASCULAR RESEARCH NURSE   [facility]  (--)    [address] [facility] [address]   [phone]            2017  9:00 AM   Established Patient with LAY Davis   [address] [address] [phone]           You will be receiving a confirmation call a few days before your appointment from our automated call confirmation system.            2017  8:15 AM   FOLLOW UP with Sai Alexandra M.D.   [facility] (--)    [address] [place] [id number] [phone]            2017  1:00 PM   Established Patient with Radha Parada M.D.   [address] [address] [phone]           You will be receiving a confirmation call a few  days before your appointment from our automated call confirmation system.              Problem List              ICD-10-CM Priority Class Noted - Resolved    NSTEMI (non-ST elevated myocardial infarction) (CMS-HCC) I21.4 High  2/18/2014 - Present    Coronary artery disease due to calcified coronary lesion: MARYCARMEN to the circumflex in 2014 I25.10, I25.84   2/18/2014 - Present    Obesity E66.9   2/18/2014 - Present    Mixed hyperlipidemia E78.2   2/28/2014 - Present    S/P coronary artery stent placement Z95.5   2/28/2014 - Present    Essential hypertension, benign I10   2/28/2014 - Present    Research study patient Z00.6   7/29/2016 - Present    Encounter for examination for normal comparison and control in clinical research program Z00.6   3/22/2017 - Present    Elevated glucose R73.09   3/24/2017 - Present    Healthcare maintenance Z00.00   3/24/2017 - Present      Health Maintenance        Date Due Completion Dates    IMM DTaP/Tdap/Td Vaccine (1 - Tdap) 10/5/1973 ---    COLONOSCOPY 10/5/2004 ---    IMM ZOSTER VACCINE 10/5/2014 ---            Current Immunizations     No immunizations on file.      Below and/or attached are the medications your provider expects you to take. Review all of your home medications and newly ordered medications with your provider and/or pharmacist. Follow medication instructions as directed by your provider and/or pharmacist. Please keep your medication list with you and share with your provider. Update the information when medications are discontinued, doses are changed, or new medications (including over-the-counter products) are added; and carry medication information at all times in the event of emergency situations     Allergies:  No Known Allergies          Medications  Valid as of: June 02, 2017 - 10:12 AM    Generic Name Brand Name Tablet Size Instructions for use    Amoxicillin-Pot Clavulanate (Tab) AUGMENTIN 875-125 MG Take 1 Tab by mouth 2 times a day for 10 days. With food           Aspirin (Tab)  MG Take 325 mg by mouth every day.        Lisinopril (Tab) PRINIVIL 5 MG Take 1 Tab by mouth every day.        MetFORMIN HCl (Tab) GLUCOPHAGE 1000 MG Take 1 Tab by mouth 2 times a day, with meals.        Methotrexate (Anti-Rheumatic)   Take  by mouth.        Metoprolol Succinate (TABLET SR 24 HR) TOPROL XL 50 MG Take 1 Tab by mouth every bedtime.        Rosuvastatin Calcium (Tab) CRESTOR 40 MG Take 1 Tab by mouth every day.        SITagliptin Phosphate (Tab) JANUVIA 100 MG Take 1 Tab by mouth every day.        .                 Medicines prescribed today were sent to:     [facility] [address] [place] [address], [place] - [address] [facility]    [address] [place] [address]    Phone: [phone] Fax: [phone]    Open 24 Hours?: No      Medication refill instructions:       If your prescription bottle indicates you have medication refills left, it is not necessary to call your provider’s office. Please contact your pharmacy and they will refill your medication.    If your prescription bottle indicates you do not have any refills left, you may request refills at any time through one of the following ways: The online Trutap system (except Urgent Care), by calling your provider’s office, or by asking your pharmacy to contact your provider’s office with a refill request. Medication refills are processed only during regular business hours and may not be available until the next business day. Your provider may request additional information or to have a follow-up visit with you prior to refilling your medication.   *Please Note: Medication refills are assigned a new Rx number when refilled electronically. Your pharmacy may indicate that no refills were authorized even though a new prescription for the same medication is available at the pharmacy. Please request the medicine by name with the pharmacy before contacting your provider for a refill.           Trutap Access Code: Activation  code not generated  Current MyChart Status: Active

## 2017-06-12 ENCOUNTER — TELEPHONE (OUTPATIENT)
Dept: ADMINISTRATIVE | Facility: MEDICAL CENTER | Age: 62
End: 2017-06-12

## 2017-06-12 NOTE — TELEPHONE ENCOUNTER
Patient in today for Visit 6.  Assessments completed per protocol.  Fasting labs drawn via left AC, processed per protocol.  [facility] labs mailed via Inspirato. Patient has been compliant with study medications.  Reports a sore throat, prescribed Augmentin 6/2, last dose of antibiotic taken 6/12, states sore throat resolved.  Medication reconciliation completed.  Two new white label medication packs dispensed, one re-dispensed.  Patient will complete the study surveys online.  Will call the patient upon receiving safety labs to complete study visit.

## 2017-07-19 ENCOUNTER — OFFICE VISIT (OUTPATIENT)
Dept: CARDIOLOGY | Facility: MEDICAL CENTER | Age: 62
End: 2017-07-19
Payer: COMMERCIAL

## 2017-07-19 VITALS
OXYGEN SATURATION: 94 % | BODY MASS INDEX: 40.46 KG/M2 | HEART RATE: 61 BPM | HEIGHT: 71 IN | DIASTOLIC BLOOD PRESSURE: 72 MMHG | WEIGHT: 289 LBS | SYSTOLIC BLOOD PRESSURE: 100 MMHG

## 2017-07-19 DIAGNOSIS — E78.2 MIXED HYPERLIPIDEMIA: ICD-10-CM

## 2017-07-19 DIAGNOSIS — I25.84 CORONARY ARTERY DISEASE DUE TO CALCIFIED CORONARY LESION: ICD-10-CM

## 2017-07-19 DIAGNOSIS — I10 ESSENTIAL HYPERTENSION, BENIGN: ICD-10-CM

## 2017-07-19 DIAGNOSIS — I25.10 CORONARY ARTERY DISEASE DUE TO CALCIFIED CORONARY LESION: ICD-10-CM

## 2017-07-19 PROCEDURE — 99214 OFFICE O/P EST MOD 30 MIN: CPT | Performed by: INTERNAL MEDICINE

## 2017-07-19 NOTE — PROGRESS NOTES
"Subjective:   Vitor Wilks is a 61 y.o. male who presents today for followup of his coronary artery disease with a drug-eluting stent placed in the circumflex for a non-ST segment elevation myocardial infarction in February 2014. He also has hypertension and hyperlipidemia.     His blood pressures at home and running approximately 125/70.     He has had no chest discomfort, dyspnea, edema, palpitations or lightheadedness.    He's walking for about 60 minutes 5 days weekly without difficulty.          Past Medical History   Diagnosis Date   • Heart attack (CMS-[facility]) 02/17/     History reviewed. No pertinent past surgical history.  Family History   Problem Relation Age of Onset   • Stroke Father 60     likely TIA     History   Smoking status   • Former Smoker -- 0.50 packs/day for 10 years   • Types: Cigarettes   • Quit date: 02/28/2008   Smokeless tobacco   • Never Used     No Known Allergies  Outpatient Encounter Prescriptions as of 7/19/2017   Medication Sig Dispense Refill   • metformin (GLUCOPHAGE) 1000 MG tablet Take 1 Tab by mouth 2 times a day, with meals. 60 Tab 11   • METHOTREXATE, ANTI-RHEUMATIC, PO Take  by mouth.     • metoprolol SR (TOPROL XL) 50 MG TABLET SR 24 HR Take 1 Tab by mouth every bedtime. 90 Tab 3   • lisinopril (PRINIVIL) 5 MG Tab Take 1 Tab by mouth every day. 90 Tab 3   • rosuvastatin (CRESTOR) 40 MG tablet Take 1 Tab by mouth every day. 90 Tab 3   • aspirin (ASA) 325 MG TABS Take 325 mg by mouth every day.     • sitagliptin (JANUVIA) 100 MG Tab Take 1 Tab by mouth every day. 30 Tab 11     No facility-administered encounter medications on file as of 7/19/2017.     ROS       Objective:   /72 mmHg  Pulse 61  Ht 1.803 m (5' 10.98\")  Wt 131.09 kg (289 lb)  BMI 40.33 kg/m2  SpO2 94%    Physical Exam   Neck: No JVD present.   Cardiovascular: Normal rate and regular rhythm.  Exam reveals no gallop.    No murmur heard.  Pulmonary/Chest: Effort normal. He has no rales.   Abdominal: " Soft. There is no tenderness.   Musculoskeletal: He exhibits no edema.     Lab Results   Component Value Date/Time    CHOLESTEROL, 01/20/2017 09:26 AM    LDL 50 01/20/2017 09:26 AM    HDL 43 01/20/2017 09:26 AM    TRIGLYCERIDES 133 01/20/2017 09:26 AM       Lab Results   Component Value Date/Time    SODIUM 138 01/20/2017 09:26 AM    POTASSIUM 4.6 01/20/2017 09:26 AM    CHLORIDE 101 01/20/2017 09:26 AM    CO2 20 01/20/2017 09:26 AM    GLUCOSE 208* 01/20/2017 09:26 AM    BUN 14 01/20/2017 09:26 AM    CREATININE 0.91 01/20/2017 09:26 AM    BUN-CREATININE RATIO 15 01/20/2017 09:26 AM     Lab Results   Component Value Date/Time    ALKALINE PHOSPHATASE 56 01/20/2017 09:26 AM    AST(SGOT) 26 01/20/2017 09:26 AM    ALT(SGPT) 34 01/20/2017 09:26 AM    TOTAL BILIRUBIN 0.6 01/20/2017 09:26 AM      Lab Results   Component Value Date/Time    B NATRIURETIC PEPTIDE 15 02/17/2014 04:07 PM          Assessment:     1. Coronary artery disease due to calcified coronary lesion: MARYCARMEN to the circumflex in 2014     2. Mixed hyperlipidemia     3. Essential hypertension, benign         Medical Decision Making:  Today's Assessment / Status / Plan:     Mr. Wilks is clinically stable. He is losing weight and averaging more than 10,000 steps a day. He exercises 5 days a week and on those days gets about 16,000 steps. The other 2 days gets about 9000. His blood pressure is low and he will reduce metoprolol to 25 mg daily if it continues to be down around 100. His lipid status has been under good control and he is having repeat lab work done today. He will follow-up in about 6 months.

## 2017-07-19 NOTE — MR AVS SNAPSHOT
"        Vitor Wilks   2017 8:15 AM   Office Visit   MRN: [id number]    Department:  [facility]   Dept Phone:  [phone]    Description:  Male : 1955   Provider:  Sai Alexandra M.D.           Reason for Visit     Follow-Up           Allergies as of 2017     No Known Allergies      You were diagnosed with     Coronary artery disease due to calcified coronary lesion   [4033224]       Mixed hyperlipidemia   [272.2.ICD-9-CM]       Essential hypertension, benign   [401.1.ICD-9-CM]         Vital Signs     Blood Pressure Pulse Height Weight Body Mass Index Oxygen Saturation    100/72 mmHg 61 1.803 m (5' 10.98\") 131.09 kg (289 lb) 40.33 kg/m2 94%    Smoking Status                   Former Smoker           Basic Information     Date Of Birth Sex Race Ethnicity Preferred Language    1955 Male White Non- English      Your appointments     2017  9:40 AM   Established Patient with Radha Parada M.D.   [address] [address] [phone]           You will be receiving a confirmation call a few days before your appointment from our automated call confirmation system.              Problem List              ICD-10-CM Priority Class Noted - Resolved    NSTEMI (non-ST elevated myocardial infarction) (CMS-HCC) I21.4 High  2014 - Present    Coronary artery disease due to calcified coronary lesion: MARYCARMEN to the circumflex in  I25.10, I25.84   2014 - Present    Obesity E66.9   2014 - Present    Mixed hyperlipidemia E78.2   2014 - Present    S/P coronary artery stent placement Z95.5   2014 - Present    Essential hypertension, benign I10   2014 - Present    Research study patient Z00.6   2016 - Present    Encounter for examination for normal comparison and control in clinical research program Z00.6   3/22/2017 - Present    Elevated glucose R73.09   3/24/2017 - Present   " Healthcare maintenance Z00.00   3/24/2017 - Present      Health Maintenance        Date Due Completion Dates    DIABETES MONOFILAMENT / LE EXAM 4/5/1955 ---    RETINAL SCREENING 10/5/1972 ---    URINE ACR / MICROALBUMIN 10/5/1972 ---    IMM DTaP/Tdap/Td Vaccine (1 - Tdap) 10/5/1973 ---    COLONOSCOPY 10/5/2004 ---    IMM ZOSTER VACCINE 10/5/2014 ---    IMM INFLUENZA (1) 9/1/2017 ---    A1C SCREENING 10/18/2017 4/18/2017, 2/17/2014    FASTING LIPID PROFILE 1/20/2018 1/20/2017, 10/12/2016, 7/19/2016, 1/9/2016, 8/20/2014, 2/18/2014    SERUM CREATININE 1/20/2018 1/20/2017, 7/19/2016, 1/9/2016, 8/20/2014, 2/21/2014, 2/19/2014, 2/18/2014, 2/17/2014, 7/21/2006            Current Immunizations     No immunizations on file.      Below and/or attached are the medications your provider expects you to take. Review all of your home medications and newly ordered medications with your provider and/or pharmacist. Follow medication instructions as directed by your provider and/or pharmacist. Please keep your medication list with you and share with your provider. Update the information when medications are discontinued, doses are changed, or new medications (including over-the-counter products) are added; and carry medication information at all times in the event of emergency situations     Allergies:  No Known Allergies          Medications  Valid as of: July 19, 2017 -  8:55 AM    Generic Name Brand Name Tablet Size Instructions for use    Aspirin (Tab)  MG Take 325 mg by mouth every day.        Lisinopril (Tab) PRINIVIL 5 MG Take 1 Tab by mouth every day.        MetFORMIN HCl (Tab) GLUCOPHAGE 1000 MG Take 1 Tab by mouth 2 times a day, with meals.        Methotrexate (Anti-Rheumatic)   Take  by mouth.        Metoprolol Succinate (TABLET SR 24 HR) TOPROL XL 50 MG Take 1 Tab by mouth every bedtime.        Rosuvastatin Calcium (Tab) CRESTOR 40 MG Take 1 Tab by mouth every day.        SITagliptin Phosphate (Tab) JANUVIA 100 MG  Take 1 Tab by mouth every day.        .                 Medicines prescribed today were sent to:     [facility] [place], [place] - [address]    [address] [place] [address]    Phone: [phone] Fax: [phone]    Open 24 Hours?: No      Medication refill instructions:       If your prescription bottle indicates you have medication refills left, it is not necessary to call your provider’s office. Please contact your pharmacy and they will refill your medication.    If your prescription bottle indicates you do not have any refills left, you may request refills at any time through one of the following ways: The online [employer] system (except Urgent Care), by calling your provider’s office, or by asking your pharmacy to contact your provider’s office with a refill request. Medication refills are processed only during regular business hours and may not be available until the next business day. Your provider may request additional information or to have a follow-up visit with you prior to refilling your medication.   *Please Note: Medication refills are assigned a new Rx number when refilled electronically. Your pharmacy may indicate that no refills were authorized even though a new prescription for the same medication is available at the pharmacy. Please request the medicine by name with the pharmacy before contacting your provider for a refill.           [employer] Access Code: Activation code not generated  Current [employer] Status: Active

## 2017-07-19 NOTE — Clinical Note
[facility]   [address]  [place] [address]  Phone: [phone]  Fax: [phone]              Vitor Wilks  1955    Encounter Date: 7/19/2017    Sai Alexandra M.D.          PROGRESS NOTE:  Subjective:   Vitor Wilks is a 61 y.o. male who presents today for followup of his coronary artery disease with a drug-eluting stent placed in the circumflex for a non-ST segment elevation myocardial infarction in February 2014. He also has hypertension and hyperlipidemia.     His blood pressures at home and running approximately 125/70.     He has had no chest discomfort, dyspnea, edema, palpitations or lightheadedness.    He's walking for about 60 minutes 5 days weekly without difficulty.          Past Medical History   Diagnosis Date   • Heart attack (CMS-[facility]) 02/17/     History reviewed. No pertinent past surgical history.  Family History   Problem Relation Age of Onset   • Stroke Father 60     likely TIA     History   Smoking status   • Former Smoker -- 0.50 packs/day for 10 years   • Types: Cigarettes   • Quit date: 02/28/2008   Smokeless tobacco   • Never Used     No Known Allergies  Outpatient Encounter Prescriptions as of 7/19/2017   Medication Sig Dispense Refill   • metformin (GLUCOPHAGE) 1000 MG tablet Take 1 Tab by mouth 2 times a day, with meals. 60 Tab 11   • METHOTREXATE, ANTI-RHEUMATIC, PO Take  by mouth.     • metoprolol SR (TOPROL XL) 50 MG TABLET SR 24 HR Take 1 Tab by mouth every bedtime. 90 Tab 3   • lisinopril (PRINIVIL) 5 MG Tab Take 1 Tab by mouth every day. 90 Tab 3   • rosuvastatin (CRESTOR) 40 MG tablet Take 1 Tab by mouth every day. 90 Tab 3   • aspirin (ASA) 325 MG TABS Take 325 mg by mouth every day.     • sitagliptin (JANUVIA) 100 MG Tab Take 1 Tab by mouth every day. 30 Tab 11     No facility-administered encounter medications on file as of 7/19/2017.     ROS       Objective:   /72 mmHg  Pulse 61  Ht  "1.803 m (5' 10.98\")  Wt 131.09 kg (289 lb)  BMI 40.33 kg/m2  SpO2 94%    Physical Exam   Neck: No JVD present.   Cardiovascular: Normal rate and regular rhythm.  Exam reveals no gallop.    No murmur heard.  Pulmonary/Chest: Effort normal. He has no rales.   Abdominal: Soft. There is no tenderness.   Musculoskeletal: He exhibits no edema.     Lab Results   Component Value Date/Time    CHOLESTEROL, 01/20/2017 09:26 AM    LDL 50 01/20/2017 09:26 AM    HDL 43 01/20/2017 09:26 AM    TRIGLYCERIDES 133 01/20/2017 09:26 AM       Lab Results   Component Value Date/Time    SODIUM 138 01/20/2017 09:26 AM    POTASSIUM 4.6 01/20/2017 09:26 AM    CHLORIDE 101 01/20/2017 09:26 AM    CO2 20 01/20/2017 09:26 AM    GLUCOSE 208* 01/20/2017 09:26 AM    BUN 14 01/20/2017 09:26 AM    CREATININE 0.91 01/20/2017 09:26 AM    BUN-CREATININE RATIO 15 01/20/2017 09:26 AM     Lab Results   Component Value Date/Time    ALKALINE PHOSPHATASE 56 01/20/2017 09:26 AM    AST(SGOT) 26 01/20/2017 09:26 AM    ALT(SGPT) 34 01/20/2017 09:26 AM    TOTAL BILIRUBIN 0.6 01/20/2017 09:26 AM      Lab Results   Component Value Date/Time    B NATRIURETIC PEPTIDE 15 02/17/2014 04:07 PM          Assessment:     1. Coronary artery disease due to calcified coronary lesion: MARYCARMEN to the circumflex in 2014     2. Mixed hyperlipidemia     3. Essential hypertension, benign         Medical Decision Making:  Today's Assessment / Status / Plan:     Mr. Wilks is clinically stable. He is losing weight and averaging more than 10,000 steps a day. He exercises 5 days a week and on those days gets about 16,000 steps. The other 2 days gets about 9000. His blood pressure is low and he will reduce metoprolol to 25 mg daily if it continues to be down around 100. His lipid status has been under good control and he is having repeat lab work done today. He will follow-up in about 6 months.      Mariela Hollins, SANGITAPAngelaR.N.  [address] [address]  VIA In Basket         "

## 2017-07-20 LAB
ALBUMIN SERPL-MCNC: 4.5 G/DL (ref 3.6–4.8)
ALBUMIN/GLOB SERPL: 1.7 {RATIO} (ref 1.2–2.2)
ALP SERPL-CCNC: 46 IU/L (ref 39–117)
ALT SERPL-CCNC: 36 IU/L (ref 0–44)
AST SERPL-CCNC: 31 IU/L (ref 0–40)
BILIRUB SERPL-MCNC: 0.6 MG/DL (ref 0–1.2)
BUN SERPL-MCNC: 15 MG/DL (ref 8–27)
BUN/CREAT SERPL: 19 (ref 10–24)
CALCIUM SERPL-MCNC: 10.1 MG/DL (ref 8.6–10.2)
CHLORIDE SERPL-SCNC: 101 MMOL/L (ref 96–106)
CHOLEST SERPL-MCNC: 105 MG/DL (ref 100–199)
CO2 SERPL-SCNC: 21 MMOL/L (ref 18–29)
COMMENT 011824: ABNORMAL
CREAT SERPL-MCNC: 0.79 MG/DL (ref 0.76–1.27)
GLOBULIN SER CALC-MCNC: 2.7 G/DL (ref 1.5–4.5)
GLUCOSE SERPL-MCNC: 129 MG/DL (ref 65–99)
HDLC SERPL-MCNC: 46 MG/DL
LDLC SERPL CALC-MCNC: 22 MG/DL (ref 0–99)
POTASSIUM SERPL-SCNC: 4.6 MMOL/L (ref 3.5–5.2)
PROT SERPL-MCNC: 7.2 G/DL (ref 6–8.5)
SODIUM SERPL-SCNC: 140 MMOL/L (ref 134–144)
TRIGL SERPL-MCNC: 187 MG/DL (ref 0–149)
VLDLC SERPL CALC-MCNC: 37 MG/DL (ref 5–40)

## 2017-07-31 ENCOUNTER — OFFICE VISIT (OUTPATIENT)
Dept: MEDICAL GROUP | Facility: PHYSICIAN GROUP | Age: 62
End: 2017-07-31
Payer: COMMERCIAL

## 2017-07-31 VITALS
OXYGEN SATURATION: 96 % | DIASTOLIC BLOOD PRESSURE: 74 MMHG | BODY MASS INDEX: 41.3 KG/M2 | SYSTOLIC BLOOD PRESSURE: 126 MMHG | WEIGHT: 295 LBS | HEART RATE: 78 BPM | HEIGHT: 71 IN | TEMPERATURE: 98.1 F | RESPIRATION RATE: 14 BRPM

## 2017-07-31 DIAGNOSIS — I25.10 CORONARY ARTERY DISEASE DUE TO CALCIFIED CORONARY LESION: ICD-10-CM

## 2017-07-31 DIAGNOSIS — I25.84 CORONARY ARTERY DISEASE DUE TO CALCIFIED CORONARY LESION: ICD-10-CM

## 2017-07-31 DIAGNOSIS — E11.9 TYPE 2 DIABETES MELLITUS WITHOUT COMPLICATION, WITHOUT LONG-TERM CURRENT USE OF INSULIN (HCC): ICD-10-CM

## 2017-07-31 DIAGNOSIS — E78.2 MIXED HYPERLIPIDEMIA: ICD-10-CM

## 2017-07-31 DIAGNOSIS — Z00.6 RESEARCH STUDY PATIENT: ICD-10-CM

## 2017-07-31 DIAGNOSIS — I10 ESSENTIAL HYPERTENSION, BENIGN: ICD-10-CM

## 2017-07-31 DIAGNOSIS — E11.8 CONTROLLED TYPE 2 DIABETES MELLITUS WITH COMPLICATION, WITHOUT LONG-TERM CURRENT USE OF INSULIN (HCC): ICD-10-CM

## 2017-07-31 LAB
HBA1C MFR BLD: 6.7 % (ref ?–5.8)
INT CON NEG: NEGATIVE
INT CON POS: POSITIVE

## 2017-07-31 PROCEDURE — 83036 HEMOGLOBIN GLYCOSYLATED A1C: CPT | Performed by: FAMILY MEDICINE

## 2017-07-31 PROCEDURE — 99214 OFFICE O/P EST MOD 30 MIN: CPT | Performed by: FAMILY MEDICINE

## 2017-07-31 RX ORDER — FOLIC ACID 1 MG/1
1 TABLET ORAL DAILY
COMMUNITY
End: 2019-02-25

## 2017-07-31 NOTE — MR AVS SNAPSHOT
"        Vitor Wilks   2017 9:40 AM   Office Visit   MRN: [id number]    Department:  [facility]   Dept Phone:  [phone]    Description:  Male : 1955   Provider:  Radha Parada M.D.           Reason for Visit     Results labs    Immunizations  pt declines today      Allergies as of 2017     No Known Allergies      You were diagnosed with     Type 2 diabetes mellitus without complication, without long-term current use of insulin (CMS-HCC)   [0745449]         Vital Signs     Blood Pressure Pulse Temperature Respirations Height Weight    126/74 mmHg 78 36.7 °C (98.1 °F) 14 1.803 m (5' 10.98\") 133.811 kg (295 lb)    Body Mass Index Oxygen Saturation Smoking Status             41.16 kg/m2 96% Former Smoker         Basic Information     Date Of Birth Sex Race Ethnicity Preferred Language    1955 Male White Non- English      Your appointments     Aug 08, 2017  9:00 AM   FOLLOW UP VISIT  - RESEARCH with CARDIOVASCULAR RESEARCH NURSE   [facility]  (--)    [address] [address]   [phone]            2018  8:45 AM   FOLLOW UP with Sai Alexandra M.D.   [facility] (--)    [address]  [facility] [address]   [phone]              Problem List              ICD-10-CM Priority Class Noted - Resolved    NSTEMI (non-ST elevated myocardial infarction) (CMS-HCC) I21.4 High  2014 - Present    Coronary artery disease due to calcified coronary lesion: MARYCARMEN to the circumflex in  I25.10, I25.84   2014 - Present    Obesity E66.9   2014 - Present    Mixed hyperlipidemia E78.2   2014 - Present    S/P coronary artery stent placement Z95.5   2014 - Present    Essential hypertension, benign I10   2014 - Present    Research study patient Z00.6   2016 - Present    Encounter for examination for normal comparison and " control in clinical research program Z00.6   3/22/2017 - Present    Elevated glucose R73.09   3/24/2017 - Present    Healthcare maintenance Z00.00   3/24/2017 - Present      Health Maintenance        Date Due Completion Dates    DIABETES MONOFILAMENT / LE EXAM 4/5/1955 ---    RETINAL SCREENING 10/5/1972 ---    URINE ACR / MICROALBUMIN 10/5/1972 ---    IMM DTaP/Tdap/Td Vaccine (1 - Tdap) 10/5/1973 ---    COLONOSCOPY 10/5/2004 ---    IMM ZOSTER VACCINE 10/5/2014 ---    IMM INFLUENZA (1) 9/1/2017 ---    A1C SCREENING 10/18/2017 4/18/2017, 2/17/2014    FASTING LIPID PROFILE 7/19/2018 7/19/2017, 1/20/2017, 10/12/2016, 7/19/2016, 1/9/2016, 8/20/2014, 2/18/2014    SERUM CREATININE 7/19/2018 7/19/2017, 1/20/2017, 7/19/2016, 1/9/2016, 8/20/2014, 2/21/2014, 2/19/2014, 2/18/2014, 2/17/2014, 7/21/2006            Results     POCT Hemoglobin A1C      Component    Glycohemoglobin    6.7    Internal Control Negative    Negative    Internal Control Positive    Positive                        Current Immunizations     No immunizations on file.      Below and/or attached are the medications your provider expects you to take. Review all of your home medications and newly ordered medications with your provider and/or pharmacist. Follow medication instructions as directed by your provider and/or pharmacist. Please keep your medication list with you and share with your provider. Update the information when medications are discontinued, doses are changed, or new medications (including over-the-counter products) are added; and carry medication information at all times in the event of emergency situations     Allergies:  No Known Allergies          Medications  Valid as of: July 31, 2017 -  1:24 PM    Generic Name Brand Name Tablet Size Instructions for use    Aspirin (Tab)  MG Take 325 mg by mouth every day.        Folic Acid (Tab) FOLVITE 1 MG Take 1 mg by mouth every day.        Lisinopril (Tab) PRINIVIL 5 MG Take 1 Tab by mouth  every day.        MetFORMIN HCl (Tab) GLUCOPHAGE 1000 MG Take 1 Tab by mouth 2 times a day, with meals.        Methotrexate (Anti-Rheumatic)   Take  by mouth.        Metoprolol Succinate (TABLET SR 24 HR) TOPROL XL 50 MG Take 1 Tab by mouth every bedtime.        Rosuvastatin Calcium (Tab) CRESTOR 40 MG Take 1 Tab by mouth every day.        SITagliptin Phosphate (Tab) JANUVIA 100 MG Take 1 Tab by mouth every day.        .                 Medicines prescribed today were sent to:     [address], [place] - [address]    [address] [facility] [place] [address]    Phone: [phone] Fax: [phone]    Open 24 Hours?: No      Medication refill instructions:       If your prescription bottle indicates you have medication refills left, it is not necessary to call your provider’s office. Please contact your pharmacy and they will refill your medication.    If your prescription bottle indicates you do not have any refills left, you may request refills at any time through one of the following ways: The online [employer] system (except Urgent Care), by calling your provider’s office, or by asking your pharmacy to contact your provider’s office with a refill request. Medication refills are processed only during regular business hours and may not be available until the next business day. Your provider may request additional information or to have a follow-up visit with you prior to refilling your medication.   *Please Note: Medication refills are assigned a new Rx number when refilled electronically. Your pharmacy may indicate that no refills were authorized even though a new prescription for the same medication is available at the pharmacy. Please request the medicine by name with the pharmacy before contacting your provider for a refill.           [employer] Access Code: Activation code not generated  Current [employer] Status: Active

## 2017-08-02 DIAGNOSIS — E78.5 DYSLIPIDEMIA: ICD-10-CM

## 2017-08-03 PROBLEM — R73.09 ELEVATED GLUCOSE: Status: RESOLVED | Noted: 2017-03-24 | Resolved: 2017-08-03

## 2017-08-03 PROBLEM — E11.8 CONTROLLED DIABETES MELLITUS TYPE 2 WITH COMPLICATIONS (HCC): Status: ACTIVE | Noted: 2017-08-03

## 2017-08-03 RX ORDER — ROSUVASTATIN CALCIUM 40 MG/1
40 TABLET, COATED ORAL DAILY
Qty: 30 TAB | Refills: 11 | OUTPATIENT
Start: 2017-08-03 | End: 2018-01-29 | Stop reason: SDUPTHER

## 2017-08-03 NOTE — ASSESSMENT & PLAN NOTE
A1c improved from 8.4 to 6.7 with metformin 1000bid and januvia 100mg daily.   He continues on lisinopril 5 mg GFR >60, continues on ASA 81 mg and crestor 40 mg. LDL 22  He has history of NSTEMI.   Currently feeling well with no chest pain.   Denies any ulcers on feet or neuropathy.   Advised on annual eye exam

## 2017-08-03 NOTE — ASSESSMENT & PLAN NOTE
NSTEMI in 2014. With stent placement. Continues on ASA, statin, ACEI, DM and HTN well controlled.   He is also in a research study taking methotrexate to reduce inflammation and reduce risk of future cardiac events. Continues on Folic acid.

## 2017-08-03 NOTE — PROGRESS NOTES
Subjective:   Vitor Wilks is a 61 y.o. male here today for evaluation and management of:     Controlled diabetes mellitus type 2 with complications (CMS-[facility])  A1c improved from 8.4 to 6.7 with metformin 1000bid and januvia 100mg daily.   He continues on lisinopril 5 mg GFR >60, continues on ASA 81 mg and crestor 40 mg. LDL 22  He has history of NSTEMI.   Currently feeling well with no chest pain.   Denies any ulcers on feet or neuropathy.   Advised on annual eye exam    Coronary artery disease due to calcified coronary lesion: MARYCARMEN to the circumflex in 2014  NSTEMI in 2014. With stent placement. Continues on ASA, statin, ACEI, DM and HTN well controlled.   He is also in a research study taking methotrexate to reduce inflammation and reduce risk of future cardiac events. Continues on Folic acid.     Research study patient  He is also in a research study taking methotrexate to reduce inflammation and reduce risk of future cardiac events. Continues on Folic acid.   Has no complaints and normal renal and hepatic function reviewed.     Mixed hyperlipidemia  He continues on crestor and LDL improved to 22.       Essential hypertension, benign  Well controlled on metoprolol and lisinopril             Current medicines (including changes today)  Current Outpatient Prescriptions   Medication Sig Dispense Refill   • folic acid (FOLVITE) 1 MG Tab Take 1 mg by mouth every day.     • metformin (GLUCOPHAGE) 1000 MG tablet Take 1 Tab by mouth 2 times a day, with meals. 60 Tab 11   • METHOTREXATE, ANTI-RHEUMATIC, PO Take  by mouth.     • metoprolol SR (TOPROL XL) 50 MG TABLET SR 24 HR Take 1 Tab by mouth every bedtime. 90 Tab 3   • lisinopril (PRINIVIL) 5 MG Tab Take 1 Tab by mouth every day. 90 Tab 3   • aspirin (ASA) 325 MG TABS Take 325 mg by mouth every day.     • rosuvastatin (CRESTOR) 40 MG tablet Take 1 Tab by mouth every day. 30 Tab 11   • sitagliptin (JANUVIA) 100 MG Tab Take 1 Tab by mouth every day. 30 Tab 11  "    No current facility-administered medications for this visit.     He  has a past medical history of Heart attack (CMS-HCC) (02/17/).    ROS  No chest pain, no shortness of breath, no abdominal pain       Objective:     Blood pressure 126/74, pulse 78, temperature 36.7 °C (98.1 °F), resp. rate 14, height 1.803 m (5' 10.98\"), weight 133.811 kg (295 lb), SpO2 96 %. Body mass index is 41.16 kg/(m^2).   Physical Exam:  Constitutional: Alert, no distress.  Skin: Warm, dry, good turgor, no rashes in visible areas.  Eye: Equal, round and reactive, conjunctiva clear, lids normal.  ENMT: Lips without lesions, good dentition, oropharynx clear.  Neck: Trachea midline, no masses, no thyromegaly. No cervical or supraclavicular lymphadenopathy  Respiratory: Unlabored respiratory effort, lungs clear to auscultation, no wheezes, no ronchi.  Cardiovascular: Normal S1, S2, no murmur, no edema.  Abdomen: Soft, non-tender, no masses, no hepatosplenomegaly.  Psych: Alert and oriented x3, normal affect and mood.        Assessment and Plan:   The following treatment plan was discussed    1. Type 2 diabetes mellitus without complication, without long-term current use of insulin (CMS-HCC)  Well controlled. Continue current medications  - POCT Hemoglobin A1C    2. HTN  Well controlled. Continue current medications    3. Coronary artery disease with history of an STEMI  Stable. Continue current medical management. Patient asymptomatic    4. Research study patient   Continue folic acid as research involves taking methotrexate. Normal labs reviewed with patient    5. Dyslipidemia  Controlled. Continue current medication      Followup: Return in about 6 months (around 1/31/2018) for DM, CAD.           "

## 2017-08-03 NOTE — ASSESSMENT & PLAN NOTE
He is also in a research study taking methotrexate to reduce inflammation and reduce risk of future cardiac events. Continues on Folic acid.   Has no complaints and normal renal and hepatic function reviewed.

## 2017-08-08 ENCOUNTER — NON-PROVIDER VISIT (OUTPATIENT)
Dept: VASCULAR LAB | Facility: MEDICAL CENTER | Age: 62
End: 2017-08-08

## 2017-08-08 NOTE — NON-PROVIDER
Saw Joseph for the CIRT Study  Visit 6.5. I drew his lab work and processed according to protocol. I went through study questions with him. He has no complaints, no AE/SAEs, no upcoming hospitalizations. I made his next appointment for 10/9/2017 @ 0900. This will be in office as I will need to exchange his study drug.

## 2017-08-21 DIAGNOSIS — I10 HTN (HYPERTENSION), BENIGN: ICD-10-CM

## 2017-08-22 RX ORDER — LISINOPRIL 5 MG/1
TABLET ORAL
Qty: 30 TAB | Refills: 11 | Status: SHIPPED | OUTPATIENT
Start: 2017-08-22 | End: 2018-01-29 | Stop reason: SDUPTHER

## 2017-10-09 ENCOUNTER — TELEPHONE (OUTPATIENT)
Dept: CARDIOLOGY | Facility: MEDICAL CENTER | Age: 62
End: 2017-10-09

## 2017-10-09 NOTE — TELEPHONE ENCOUNTER
Saw Joseph for the CIRT Study Visit 7. Went through study questions. Lab draw was attempted. Was unable to obtain. Lab slip given to the patient and he will go to [facility]. He has had no side effects to the methotrexate/palcebo. No NELLY/AE to report. No upcoming surgeries. He returned his SD and I gave him 3 new packs. Next appointment was made for 12/18/2017 @ 0900. He know s to call if e has any questions or problems.

## 2017-12-20 ENCOUNTER — TELEPHONE (OUTPATIENT)
Dept: CARDIOLOGY | Facility: MEDICAL CENTER | Age: 62
End: 2017-12-20

## 2017-12-20 NOTE — TELEPHONE ENCOUNTER
Called Joseph for the CIRT Study V7.5. Safety labs were received, titration algorithm was ran. He is to continue on 20 mg of SD every Saturday. He states understanding. No AE/NELLY's were reported during this visit. He has an appt with cardiologist Jan 8. Next office visit is due in February 2018.

## 2018-01-08 ENCOUNTER — OFFICE VISIT (OUTPATIENT)
Dept: CARDIOLOGY | Facility: MEDICAL CENTER | Age: 63
End: 2018-01-08
Payer: COMMERCIAL

## 2018-01-08 VITALS
WEIGHT: 300 LBS | BODY MASS INDEX: 42 KG/M2 | HEIGHT: 71 IN | HEART RATE: 78 BPM | DIASTOLIC BLOOD PRESSURE: 70 MMHG | SYSTOLIC BLOOD PRESSURE: 132 MMHG

## 2018-01-08 DIAGNOSIS — E78.2 MIXED HYPERLIPIDEMIA: ICD-10-CM

## 2018-01-08 DIAGNOSIS — I25.84 CORONARY ARTERY DISEASE DUE TO CALCIFIED CORONARY LESION: ICD-10-CM

## 2018-01-08 DIAGNOSIS — I25.10 CORONARY ARTERY DISEASE DUE TO CALCIFIED CORONARY LESION: ICD-10-CM

## 2018-01-08 DIAGNOSIS — I10 ESSENTIAL HYPERTENSION, BENIGN: ICD-10-CM

## 2018-01-08 PROCEDURE — 99214 OFFICE O/P EST MOD 30 MIN: CPT | Performed by: INTERNAL MEDICINE

## 2018-01-08 NOTE — PROGRESS NOTES
"Subjective:   Vitor Wilks is a 62 y.o. male who presents today for followup of his coronary artery disease with a drug-eluting stent placed in the circumflex for a non-ST segment elevation myocardial infarction in February 2014. He also has hypertension and hyperlipidemia.     His blood pressures at home and running approximately 125/70.     He has had no chest discomfort, dyspnea, edema, palpitations or lightheadedness.    Unfortunately, is not walking regularly as he was previously. He's gained about 11 pounds.          Past Medical History:   asdfasdfads Date   • Heart attack 02/17/     No past surgical history on file.  Family History   Problem Relation Age of Onset   • Stroke Father 60     likely TIA     History   Smoking Status   • Former Smoker   • Packs/day: 0.50   • Years: 10.00   • Types: Cigarettes   • Quit date: 2/28/2008   Smokeless Tobacco   • Never Used     No Known Allergies  Outpatient Encounter Prescriptions as of 1/8/2018   Medication Sig Dispense Refill   • lisinopril (PRINIVIL) 5 MG Tab TAKE ONE TABLET BY MOUTH ONCE DAILY 30 Tab 11   • rosuvastatin (CRESTOR) 40 MG tablet Take 1 Tab by mouth every day. 30 Tab 11   • folic acid (FOLVITE) 1 MG Tab Take 1 mg by mouth every day.     • metformin (GLUCOPHAGE) 1000 MG tablet Take 1 Tab by mouth 2 times a day, with meals. 60 Tab 11   • METHOTREXATE, ANTI-RHEUMATIC, PO Take  by mouth.     • metoprolol SR (TOPROL XL) 50 MG TABLET SR 24 HR Take 1 Tab by mouth every bedtime. 90 Tab 3   • aspirin (ASA) 325 MG TABS Take 325 mg by mouth every day.       No facility-administered encounter medications on file as of 1/8/2018.      ROS       Objective:   /70   Pulse 78   Ht 1.803 m (5' 11\")   Wt (!) 136.1 kg (300 lb)   BMI 41.84 kg/m²     Physical Exam   Neck: No JVD present.   Cardiovascular: Normal rate and regular rhythm.  Exam reveals no gallop.    No murmur heard.  Pulmonary/Chest: Effort normal. He has no rales.   Abdominal: Soft. There is no " tenderness.   Musculoskeletal: He exhibits no edema.     Lab Results   Component Value Date/Time    CHOLSTRLTOT 105 07/19/2017 09:26 AM    CHOLSTRLTOT 187 01/09/2016 11:10 AM    LDL 22 07/19/2017 09:26 AM     (H) 01/09/2016 11:10 AM    HDL 46 07/19/2017 09:26 AM    HDL 36 (A) 01/09/2016 11:10 AM    TRIGLYCERIDE 187 (H) 07/19/2017 09:26 AM    TRIGLYCERIDE 238 (H) 01/09/2016 11:10 AM       Lab Results   Component Value Date/Time    SODIUM 140 07/19/2017 09:26 AM    SODIUM 140 01/09/2016 11:10 AM    POTASSIUM 4.6 07/19/2017 09:26 AM    POTASSIUM 4.3 01/09/2016 11:10 AM    CHLORIDE 101 07/19/2017 09:26 AM    CHLORIDE 108 01/09/2016 11:10 AM    CO2 21 07/19/2017 09:26 AM    CO2 22 01/09/2016 11:10 AM    GLUCOSE 129 (H) 07/19/2017 09:26 AM    GLUCOSE 158 (H) 01/09/2016 11:10 AM    BUN 15 07/19/2017 09:26 AM    BUN 17 01/09/2016 11:10 AM    CREATININE 0.79 07/19/2017 09:26 AM    CREATININE 0.89 01/09/2016 11:10 AM    CREATININE 1.1 07/21/2006 10:40 AM    BUNCREATRAT 19 07/19/2017 09:26 AM     Lab Results   Component Value Date/Time    ALKPHOSPHAT 46 07/19/2017 09:26 AM    ALKPHOSPHAT 65 01/09/2016 11:10 AM    ASTSGOT 31 07/19/2017 09:26 AM    ASTSGOT 37 01/09/2016 11:10 AM    ALTSGPT 36 07/19/2017 09:26 AM    ALTSGPT 55 (H) 01/09/2016 11:10 AM    TBILIRUBIN 0.6 07/19/2017 09:26 AM    TBILIRUBIN 0.7 01/09/2016 11:10 AM      Lab Results   Component Value Date/Time    BNPBTYPENAT 15 02/17/2014 04:07 PM          Assessment:     1. Coronary artery disease due to calcified coronary lesion: MARYCARMEN to the circumflex in 2014     2. Mixed hyperlipidemia     3. Essential hypertension, benign         Medical Decision Making:  Today's Assessment / Status / Plan:     Mr. Wilks is clinically stable. We again discussed increasing his physical activity, aerobic conditioning and a Mediterranean style diet. He will follow-up in about 6 months with repeat lab work. His blood pressure and lipid status are under good control. He has been  asymptomatic from a cardiovascular standpoint.

## 2018-01-08 NOTE — LETTER
[facility]   [address]  [place] [address]  Phone: [phone]  Fax: [phone]              Vitor Wilks  1955    Encounter Date: 1/8/2018    Sai Alexandra M.D.          PROGRESS NOTE:  Subjective:   Vitor Wilks is a 62 y.o. male who presents today for followup of his coronary artery disease with a drug-eluting stent placed in the circumflex for a non-ST segment elevation myocardial infarction in February 2014. He also has hypertension and hyperlipidemia.     His blood pressures at home and running approximately 125/70.     He has had no chest discomfort, dyspnea, edema, palpitations or lightheadedness.    Unfortunately, is not walking regularly as he was previously. He's gained about 11 pounds.          Past Medical History:   asdfasdfads Date   • Heart attack 02/17/     No past surgical history on file.  Family History   Problem Relation Age of Onset   • Stroke Father 60     likely TIA     History   Smoking Status   • Former Smoker   • Packs/day: 0.50   • Years: 10.00   • Types: Cigarettes   • Quit date: 2/28/2008   Smokeless Tobacco   • Never Used     No Known Allergies  Outpatient Encounter Prescriptions as of 1/8/2018   Medication Sig Dispense Refill   • lisinopril (PRINIVIL) 5 MG Tab TAKE ONE TABLET BY MOUTH ONCE DAILY 30 Tab 11   • rosuvastatin (CRESTOR) 40 MG tablet Take 1 Tab by mouth every day. 30 Tab 11   • folic acid (FOLVITE) 1 MG Tab Take 1 mg by mouth every day.     • metformin (GLUCOPHAGE) 1000 MG tablet Take 1 Tab by mouth 2 times a day, with meals. 60 Tab 11   • METHOTREXATE, ANTI-RHEUMATIC, PO Take  by mouth.     • metoprolol SR (TOPROL XL) 50 MG TABLET SR 24 HR Take 1 Tab by mouth every bedtime. 90 Tab 3   • aspirin (ASA) 325 MG TABS Take 325 mg by mouth every day.       No facility-administered encounter medications on file as of 1/8/2018.      ROS       Objective:   /70   Pulse 78   Ht 1.803 m (5'  "11\")   Wt (!) 136.1 kg (300 lb)   BMI 41.84 kg/m²      Physical Exam   Neck: No JVD present.   Cardiovascular: Normal rate and regular rhythm.  Exam reveals no gallop.    No murmur heard.  Pulmonary/Chest: Effort normal. He has no rales.   Abdominal: Soft. There is no tenderness.   Musculoskeletal: He exhibits no edema.     Lab Results   Component Value Date/Time    CHOLSTRLTOT 105 07/19/2017 09:26 AM    CHOLSTRLTOT 187 01/09/2016 11:10 AM    LDL 22 07/19/2017 09:26 AM     (H) 01/09/2016 11:10 AM    HDL 46 07/19/2017 09:26 AM    HDL 36 (A) 01/09/2016 11:10 AM    TRIGLYCERIDE 187 (H) 07/19/2017 09:26 AM    TRIGLYCERIDE 238 (H) 01/09/2016 11:10 AM       Lab Results   Component Value Date/Time    SODIUM 140 07/19/2017 09:26 AM    SODIUM 140 01/09/2016 11:10 AM    POTASSIUM 4.6 07/19/2017 09:26 AM    POTASSIUM 4.3 01/09/2016 11:10 AM    CHLORIDE 101 07/19/2017 09:26 AM    CHLORIDE 108 01/09/2016 11:10 AM    CO2 21 07/19/2017 09:26 AM    CO2 22 01/09/2016 11:10 AM    GLUCOSE 129 (H) 07/19/2017 09:26 AM    GLUCOSE 158 (H) 01/09/2016 11:10 AM    BUN 15 07/19/2017 09:26 AM    BUN 17 01/09/2016 11:10 AM    CREATININE 0.79 07/19/2017 09:26 AM    CREATININE 0.89 01/09/2016 11:10 AM    CREATININE 1.1 07/21/2006 10:40 AM    BUNCREATRAT 19 07/19/2017 09:26 AM     Lab Results   Component Value Date/Time    ALKPHOSPHAT 46 07/19/2017 09:26 AM    ALKPHOSPHAT 65 01/09/2016 11:10 AM    ASTSGOT 31 07/19/2017 09:26 AM    ASTSGOT 37 01/09/2016 11:10 AM    ALTSGPT 36 07/19/2017 09:26 AM    ALTSGPT 55 (H) 01/09/2016 11:10 AM    TBILIRUBIN 0.6 07/19/2017 09:26 AM    TBILIRUBIN 0.7 01/09/2016 11:10 AM      Lab Results   Component Value Date/Time    BNPBTYPENAT 15 02/17/2014 04:07 PM          Assessment:     1. Coronary artery disease due to calcified coronary lesion: MARYCARMEN to the circumflex in 2014     2. Mixed hyperlipidemia     3. Essential hypertension, benign         Medical Decision Making:  Today's Assessment / Status / Plan:     "     Mr. Wilks is clinically stable. We again discussed increasing his physical activity, aerobic conditioning and a Mediterranean style diet. He will follow-up in about 6 months with repeat lab work. His blood pressure and lipid status of Benadryl good control. He has been asymptomatic from a cardiovascular standpoint.      Radha Parada M.D.  [address] [place] [address]  VIA In Basket

## 2018-01-29 ENCOUNTER — OFFICE VISIT (OUTPATIENT)
Dept: MEDICAL GROUP | Facility: PHYSICIAN GROUP | Age: 63
End: 2018-01-29
Payer: COMMERCIAL

## 2018-01-29 VITALS
HEART RATE: 61 BPM | DIASTOLIC BLOOD PRESSURE: 76 MMHG | RESPIRATION RATE: 12 BRPM | WEIGHT: 301 LBS | BODY MASS INDEX: 42.14 KG/M2 | TEMPERATURE: 97 F | HEIGHT: 71 IN | SYSTOLIC BLOOD PRESSURE: 124 MMHG | OXYGEN SATURATION: 93 %

## 2018-01-29 DIAGNOSIS — E11.9 TYPE 2 DIABETES MELLITUS WITHOUT COMPLICATION, WITHOUT LONG-TERM CURRENT USE OF INSULIN (HCC): ICD-10-CM

## 2018-01-29 DIAGNOSIS — E66.01 MORBID OBESITY WITH BMI OF 40.0-44.9, ADULT (HCC): ICD-10-CM

## 2018-01-29 DIAGNOSIS — I25.84 CORONARY ARTERY DISEASE DUE TO CALCIFIED CORONARY LESION: ICD-10-CM

## 2018-01-29 DIAGNOSIS — I25.10 CORONARY ARTERY DISEASE DUE TO CALCIFIED CORONARY LESION: ICD-10-CM

## 2018-01-29 DIAGNOSIS — I10 HTN (HYPERTENSION), BENIGN: ICD-10-CM

## 2018-01-29 DIAGNOSIS — I10 ESSENTIAL HYPERTENSION, BENIGN: ICD-10-CM

## 2018-01-29 DIAGNOSIS — Z12.11 COLON CANCER SCREENING: ICD-10-CM

## 2018-01-29 DIAGNOSIS — I10 ESSENTIAL HYPERTENSION: ICD-10-CM

## 2018-01-29 DIAGNOSIS — E78.2 MIXED HYPERLIPIDEMIA: ICD-10-CM

## 2018-01-29 DIAGNOSIS — I25.2 HISTORY OF NON-ST ELEVATION MYOCARDIAL INFARCTION (NSTEMI): ICD-10-CM

## 2018-01-29 DIAGNOSIS — E11.8 CONTROLLED TYPE 2 DIABETES MELLITUS WITH COMPLICATION, WITHOUT LONG-TERM CURRENT USE OF INSULIN (HCC): ICD-10-CM

## 2018-01-29 DIAGNOSIS — E78.5 DYSLIPIDEMIA: ICD-10-CM

## 2018-01-29 PROCEDURE — 99214 OFFICE O/P EST MOD 30 MIN: CPT | Performed by: FAMILY MEDICINE

## 2018-01-29 RX ORDER — LISINOPRIL 5 MG/1
TABLET ORAL
Qty: 90 TAB | Refills: 3 | Status: SHIPPED | OUTPATIENT
Start: 2018-01-29 | End: 2019-02-07 | Stop reason: SDUPTHER

## 2018-01-29 RX ORDER — METOPROLOL SUCCINATE 50 MG/1
50 TABLET, EXTENDED RELEASE ORAL
Qty: 90 TAB | Refills: 3 | Status: SHIPPED | OUTPATIENT
Start: 2018-01-29 | End: 2019-02-10 | Stop reason: SDUPTHER

## 2018-01-29 RX ORDER — ROSUVASTATIN CALCIUM 40 MG/1
40 TABLET, COATED ORAL DAILY
Qty: 90 TAB | Refills: 3 | Status: SHIPPED | OUTPATIENT
Start: 2018-01-29 | End: 2019-02-25

## 2018-01-29 NOTE — ASSESSMENT & PLAN NOTE
NSTEMI in 2014, had stent placed, stable on current medical management with asa, metoprolol, lisinopril, rosuvastatin.   DM and HTN well controlled  Continues in a research study taking methotrexate to reduce inflammation and reduce risk of future cardiac events.   Currently asymptomatic with no chest pain, palpitations, shortness of breath.

## 2018-01-29 NOTE — ASSESSMENT & PLAN NOTE
He has been on metformin 1000 bid last A1c in July improve to 6.7  GFR is normal, LDL 37  He is on asa, lisinopril, rosuvastatin  Normal foot exam today  Will repeat A1c, urine microalbumin  Will refer to ophthalmology for retinal exam.

## 2018-01-31 NOTE — PROGRESS NOTES
Subjective:   Vitor Wilks is a 62 y.o. male here today for evaluation and management of:     Controlled diabetes mellitus type 2 with complications (CMS-HCC)  He has been on metformin 1000 bid last A1c in July improve to 6.7  GFR is normal, LDL 37  He is on asa, lisinopril, rosuvastatin  Normal foot exam today  Will repeat A1c, urine microalbumin  Will refer to ophthalmology for retinal exam.     Essential hypertension, benign  Well controlled on metoprolol and lisinopril.     Mixed hyperlipidemia  Well controlled on rosuvastatin 40 mg  Followed by his cardiologist.     Coronary artery disease due to calcified coronary lesion: MARYCARMEN to the circumflex in 2014  NSTEMI in 2014, had stent placed, stable on current medical management with asa, metoprolol, lisinopril, rosuvastatin.   DM and HTN well controlled  Continues in a research study taking methotrexate to reduce inflammation and reduce risk of future cardiac events.   Currently asymptomatic with no chest pain, palpitations, shortness of breath.          Current medicines (including changes today)  Current Outpatient Prescriptions   Medication Sig Dispense Refill   • metoprolol SR (TOPROL XL) 50 MG TABLET SR 24 HR Take 1 Tab by mouth every bedtime. 90 Tab 3   • rosuvastatin (CRESTOR) 40 MG tablet Take 1 Tab by mouth every day. 90 Tab 3   • lisinopril (PRINIVIL) 5 MG Tab TAKE ONE TABLET BY MOUTH ONCE DAILY 90 Tab 3   • metformin (GLUCOPHAGE) 1000 MG tablet Take 1 Tab by mouth 2 times a day, with meals. 180 Tab 3   • folic acid (FOLVITE) 1 MG Tab Take 1 mg by mouth every day.     • METHOTREXATE, ANTI-RHEUMATIC, PO Take  by mouth.     • aspirin (ASA) 325 MG TABS Take 325 mg by mouth every day.       No current facility-administered medications for this visit.      He  has a past medical history of Heart attack (02/17/).    ROS  No chest pain, no shortness of breath, no abdominal pain       Objective:     Blood pressure 124/76, pulse 61, temperature 36.1 °C  "(97 °F), resp. rate 12, height 1.803 m (5' 11\"), weight (!) 136.5 kg (301 lb), SpO2 93 %. Body mass index is 41.98 kg/m².   Physical Exam:  Constitutional: Alert, no distress.  Skin: Warm, dry, good turgor, no rashes in visible areas.  Eye: Equal, round and reactive, conjunctiva clear, lids normal.  ENMT: Lips without lesions, good dentition, oropharynx clear.  Neck: Trachea midline, no masses, no thyromegaly. No cervical or supraclavicular lymphadenopathy  Respiratory: Unlabored respiratory effort, lungs clear to auscultation, no wheezes, no ronchi.  Cardiovascular: Normal S1, S2, no murmur, no edema.  Abdomen: Soft, non-tender, no masses, no hepatosplenomegaly.  Psych: Alert and oriented x3, normal affect and mood.        Assessment and Plan:   The following treatment plan was discussed    1. History of non-ST elevation myocardial infarction (NSTEMI)  Stable on medical management, blood pressure and blood sugars controlled.   - metoprolol SR (TOPROL XL) 50 MG TABLET SR 24 HR; Take 1 Tab by mouth every bedtime.  Dispense: 90 Tab; Refill: 3    2. Essential hypertension  Controlled.   - metoprolol SR (TOPROL XL) 50 MG TABLET SR 24 HR; Take 1 Tab by mouth every bedtime.  Dispense: 90 Tab; Refill: 3  - lisinopril (PRINIVIL) 5 MG Tab; TAKE ONE TABLET BY MOUTH ONCE DAILY  Dispense: 90 Tab; Refill: 3    3. Dyslipidemia  Controlled.   - rosuvastatin (CRESTOR) 40 MG tablet; Take 1 Tab by mouth every day.  Dispense: 90 Tab; Refill: 3    4. Type 2 diabetes mellitus without complication, without long-term current use of insulin (HCC)  Controlled.   - metformin (GLUCOPHAGE) 1000 MG tablet; Take 1 Tab by mouth 2 times a day, with meals.  Dispense: 180 Tab; Refill: 3  - HEMOGLOBIN A1C; Future  - MICROALBUMIN CREAT RATIO URINE; Future  - Diabetic Monofilament Lower Extremity Exam  - REFERRAL TO OPHTHALMOLOGY      5. Morbid obesity with BMI of 40.0-44.9, adult (CMS-[facility])  - Patient identified as having weight management issue.  " Appropriate orders and counseling given.    6. Colon cancer screening  - OCCULT BLOOD FECES IMMUNOASSAY (FIT); Future      Followup: Return in about 1 year (around 1/29/2019) for DM, HTN, CAD.

## 2018-02-12 ENCOUNTER — TELEPHONE (OUTPATIENT)
Dept: CARDIOLOGY | Facility: MEDICAL CENTER | Age: 63
End: 2018-02-12

## 2018-06-07 ENCOUNTER — TELEPHONE (OUTPATIENT)
Dept: CARDIOLOGY | Facility: MEDICAL CENTER | Age: 63
End: 2018-06-07

## 2018-06-07 NOTE — TELEPHONE ENCOUNTER
Called pt. To let him know I faxed lab orders for blood work (CMP/Lipid) per Dr. John Alexandra Cardiologist to [facility]. [facility]. In [place],   fax Number is [phone].

## 2018-06-08 LAB
ALBUMIN SERPL-MCNC: 4.3 G/DL (ref 3.6–4.8)
ALBUMIN/GLOB SERPL: 1.4 {RATIO} (ref 1.2–2.2)
ALP SERPL-CCNC: 54 IU/L (ref 39–117)
ALT SERPL-CCNC: 35 IU/L (ref 0–44)
AST SERPL-CCNC: 34 IU/L (ref 0–40)
BILIRUB SERPL-MCNC: 0.6 MG/DL (ref 0–1.2)
BUN SERPL-MCNC: 11 MG/DL (ref 8–27)
BUN/CREAT SERPL: 12 (ref 10–24)
CALCIUM SERPL-MCNC: 9.6 MG/DL (ref 8.6–10.2)
CHLORIDE SERPL-SCNC: 101 MMOL/L (ref 96–106)
CHOLEST SERPL-MCNC: 156 MG/DL (ref 100–199)
CO2 SERPL-SCNC: 23 MMOL/L (ref 18–29)
CREAT SERPL-MCNC: 0.91 MG/DL (ref 0.76–1.27)
GFR SERPLBLD CREATININE-BSD FMLA CKD-EPI: 103 ML/MIN/1.73
GFR SERPLBLD CREATININE-BSD FMLA CKD-EPI: 89 ML/MIN/1.73
GLOBULIN SER CALC-MCNC: 3 G/DL (ref 1.5–4.5)
GLUCOSE SERPL-MCNC: 194 MG/DL (ref 65–99)
HDLC SERPL-MCNC: 45 MG/DL
LABORATORY COMMENT REPORT: ABNORMAL
LDLC SERPL CALC-MCNC: 46 MG/DL (ref 0–99)
POTASSIUM SERPL-SCNC: 4.9 MMOL/L (ref 3.5–5.2)
PROT SERPL-MCNC: 7.3 G/DL (ref 6–8.5)
SODIUM SERPL-SCNC: 139 MMOL/L (ref 134–144)
TRIGL SERPL-MCNC: 327 MG/DL (ref 0–149)
VLDLC SERPL CALC-MCNC: 65 MG/DL (ref 5–40)

## 2018-06-18 ENCOUNTER — OFFICE VISIT (OUTPATIENT)
Dept: CARDIOLOGY | Facility: MEDICAL CENTER | Age: 63
End: 2018-06-18
Payer: COMMERCIAL

## 2018-06-18 VITALS
DIASTOLIC BLOOD PRESSURE: 90 MMHG | OXYGEN SATURATION: 93 % | HEART RATE: 64 BPM | SYSTOLIC BLOOD PRESSURE: 132 MMHG | BODY MASS INDEX: 42.22 KG/M2 | WEIGHT: 301.6 LBS | HEIGHT: 71 IN

## 2018-06-18 DIAGNOSIS — I25.84 CORONARY ARTERY DISEASE DUE TO CALCIFIED CORONARY LESION: ICD-10-CM

## 2018-06-18 DIAGNOSIS — I10 ESSENTIAL HYPERTENSION, BENIGN: ICD-10-CM

## 2018-06-18 DIAGNOSIS — I25.10 CORONARY ARTERY DISEASE DUE TO CALCIFIED CORONARY LESION: ICD-10-CM

## 2018-06-18 DIAGNOSIS — E78.2 MIXED HYPERLIPIDEMIA: ICD-10-CM

## 2018-06-18 PROCEDURE — 99214 OFFICE O/P EST MOD 30 MIN: CPT | Performed by: INTERNAL MEDICINE

## 2018-06-18 NOTE — LETTER
[facility]   [address]  [place] [address]  Phone: [phone]  Fax: [phone]              Vitor Wilks  1955    Encounter Date: 6/18/2018    Sai Alexandra M.D.          PROGRESS NOTE:  Chief Complaint   Patient presents with   • Coronary Artery Disease     6 month follow up       Subjective:   Vitor Wilks is a 62 y.o. male who presents today for followup of his coronary artery disease with a drug-eluting stent placed in the circumflex for a non-ST segment elevation myocardial infarction in February 2014. He also has hypertension and hyperlipidemia.     He has had increasing difficulty over the last several months with arthralgias and myalgias.  This seems to be improved somewhat if he reduces his Crestor dosage.  But, his symptoms have been progressive.    He is also had similar side effects on atorvastatin but they worked as severe.    He has had no chest discomfort, PND, orthopnea, palpitations or lightheadedness.  He does have some dependent edema.    Past Medical History:   Diagnosis Date   • Heart attack (HCC) 02/17/     No past surgical history on file.  Family History   Problem Relation Age of Onset   • Stroke Father 60     likely TIA     Social History     Social History   • Marital status:      Spouse name: N/A   • Number of children: N/A   • Years of education: N/A     Occupational History   • Not on file.     Social History Main Topics   • Smoking status: Former Smoker     Packs/day: 0.50     Years: 10.00     Types: Cigarettes     Quit date: 2/28/2008   • Smokeless tobacco: Never Used   • Alcohol use No   • Drug use: No   • Sexual activity: Not on file     Other Topics Concern   • Not on file     Social History Narrative   • No narrative on file     No Known Allergies  Outpatient Encounter Prescriptions as of 6/18/2018   Medication Sig Dispense Refill   • metoprolol SR (TOPROL XL) 50 MG TABLET SR 24  "HR Take 1 Tab by mouth every bedtime. 90 Tab 3   • rosuvastatin (CRESTOR) 40 MG tablet Take 1 Tab by mouth every day. 90 Tab 3   • lisinopril (PRINIVIL) 5 MG Tab TAKE ONE TABLET BY MOUTH ONCE DAILY 90 Tab 3   • metformin (GLUCOPHAGE) 1000 MG tablet Take 1 Tab by mouth 2 times a day, with meals. 180 Tab 3   • aspirin (ASA) 325 MG TABS Take 325 mg by mouth every day.     • folic acid (FOLVITE) 1 MG Tab Take 1 mg by mouth every day.     • METHOTREXATE, ANTI-RHEUMATIC, PO Take  by mouth.       No facility-administered encounter medications on file as of 6/18/2018.      ROS     Objective:   /90   Pulse 64   Ht 1.803 m (5' 11\")   Wt (!) 136.8 kg (301 lb 9.6 oz)   SpO2 93%   BMI 42.06 kg/m²      Physical Exam   Constitutional: He appears well-developed and well-nourished.   Neck: No JVD present.   Cardiovascular: Normal rate and regular rhythm.    No murmur heard.  Pulmonary/Chest: Effort normal and breath sounds normal. He has no rales.   Abdominal: Soft. There is no tenderness.   Musculoskeletal: He exhibits no edema.     Lab Results   Component Value Date/Time    CHOLSTRLTOT 156 06/07/2018 09:24 AM    CHOLSTRLTOT 187 01/09/2016 11:10 AM    LDL 46 06/07/2018 09:24 AM     (H) 01/09/2016 11:10 AM    HDL 45 06/07/2018 09:24 AM    HDL 36 (A) 01/09/2016 11:10 AM    TRIGLYCERIDE 327 (H) 06/07/2018 09:24 AM    TRIGLYCERIDE 238 (H) 01/09/2016 11:10 AM       Lab Results   Component Value Date/Time    SODIUM 139 06/07/2018 09:24 AM    SODIUM 140 01/09/2016 11:10 AM    POTASSIUM 4.9 06/07/2018 09:24 AM    POTASSIUM 4.3 01/09/2016 11:10 AM    CHLORIDE 101 06/07/2018 09:24 AM    CHLORIDE 108 01/09/2016 11:10 AM    CO2 23 06/07/2018 09:24 AM    CO2 22 01/09/2016 11:10 AM    GLUCOSE 194 (H) 06/07/2018 09:24 AM    GLUCOSE 158 (H) 01/09/2016 11:10 AM    BUN 11 06/07/2018 09:24 AM    BUN 17 01/09/2016 11:10 AM    CREATININE 0.91 06/07/2018 09:24 AM    CREATININE 0.89 01/09/2016 11:10 AM    CREATININE 1.1 07/21/2006 10:40 " AM    BUNCREATRAT 12 06/07/2018 09:24 AM     Lab Results   Component Value Date/Time    ALKPHOSPHAT 54 06/07/2018 09:24 AM    ALKPHOSPHAT 65 01/09/2016 11:10 AM    ASTSGOT 34 06/07/2018 09:24 AM    ASTSGOT 37 01/09/2016 11:10 AM    ALTSGPT 35 06/07/2018 09:24 AM    ALTSGPT 55 (H) 01/09/2016 11:10 AM    TBILIRUBIN 0.6 06/07/2018 09:24 AM    TBILIRUBIN 0.7 01/09/2016 11:10 AM      Lab Results   Component Value Date/Time    BNPBTYPENAT 15 02/17/2014 04:07 PM          Assessment:     1. Coronary artery disease due to calcified coronary lesion: MARYCARMEN to the circumflex in 2014     2. Mixed hyperlipidemia     3. Essential hypertension, benign         Medical Decision Making:  Today's Assessment / Status / Plan:     Mr. Wilks is having difficulty with arthralgias and myalgias which are very likely secondary to rosuvastatin therapy.  At this time, we will discontinue rosuvastatin.  His blood pressure is up today but has been under good control at home.  I would like him to check his blood pressures 3 times a week and bring in a record for our review when he follows up in about a month.  He will also have a repeat lipid panel about a month out of statin therapy.  Will decide whether or not to try a lower dose statin and ezetimibe or to proceed to a PCSK-9 inhibitor.      Radha Parada M.D.  [address] [place] [address]  VIA In Basket

## 2018-06-18 NOTE — PROGRESS NOTES
Chief Complaint   Patient presents with   • Coronary Artery Disease     6 month follow up       Subjective:   Vitor Wilks is a 62 y.o. male who presents today for followup of his coronary artery disease with a drug-eluting stent placed in the circumflex for a non-ST segment elevation myocardial infarction in February 2014. He also has hypertension and hyperlipidemia.     He has had increasing difficulty over the last several months with arthralgias and myalgias.  This seems to be improved somewhat if he reduces his Crestor dosage.  But, his symptoms have been progressive.    He is also had similar side effects on atorvastatin but they worked as severe.    He has had no chest discomfort, PND, orthopnea, palpitations or lightheadedness.  He does have some dependent edema.    Past Medical History:   Diagnosis Date   • Heart attack (HCC) 02/17/     No past surgical history on file.  Family History   Problem Relation Age of Onset   • Stroke Father 60     likely TIA     Social History     Social History   • Marital status:      Spouse name: N/A   • Number of children: N/A   • Years of education: N/A     Occupational History   • Not on file.     Social History Main Topics   • Smoking status: Former Smoker     Packs/day: 0.50     Years: 10.00     Types: Cigarettes     Quit date: 2/28/2008   • Smokeless tobacco: Never Used   • Alcohol use No   • Drug use: No   • Sexual activity: Not on file     Other Topics Concern   • Not on file     Social History Narrative   • No narrative on file     No Known Allergies  Outpatient Encounter Prescriptions as of 6/18/2018   Medication Sig Dispense Refill   • metoprolol SR (TOPROL XL) 50 MG TABLET SR 24 HR Take 1 Tab by mouth every bedtime. 90 Tab 3   • rosuvastatin (CRESTOR) 40 MG tablet Take 1 Tab by mouth every day. 90 Tab 3   • lisinopril (PRINIVIL) 5 MG Tab TAKE ONE TABLET BY MOUTH ONCE DAILY 90 Tab 3   • metformin (GLUCOPHAGE) 1000 MG tablet Take 1 Tab by mouth 2  "times a day, with meals. 180 Tab 3   • aspirin (ASA) 325 MG TABS Take 325 mg by mouth every day.     • folic acid (FOLVITE) 1 MG Tab Take 1 mg by mouth every day.     • METHOTREXATE, ANTI-RHEUMATIC, PO Take  by mouth.       No facility-administered encounter medications on file as of 6/18/2018.      ROS     Objective:   /90   Pulse 64   Ht 1.803 m (5' 11\")   Wt (!) 136.8 kg (301 lb 9.6 oz)   SpO2 93%   BMI 42.06 kg/m²     Physical Exam   Constitutional: He appears well-developed and well-nourished.   Neck: No JVD present.   Cardiovascular: Normal rate and regular rhythm.    No murmur heard.  Pulmonary/Chest: Effort normal and breath sounds normal. He has no rales.   Abdominal: Soft. There is no tenderness.   Musculoskeletal: He exhibits no edema.     Lab Results   Component Value Date/Time    CHOLSTRLTOT 156 06/07/2018 09:24 AM    CHOLSTRLTOT 187 01/09/2016 11:10 AM    LDL 46 06/07/2018 09:24 AM     (H) 01/09/2016 11:10 AM    HDL 45 06/07/2018 09:24 AM    HDL 36 (A) 01/09/2016 11:10 AM    TRIGLYCERIDE 327 (H) 06/07/2018 09:24 AM    TRIGLYCERIDE 238 (H) 01/09/2016 11:10 AM       Lab Results   Component Value Date/Time    SODIUM 139 06/07/2018 09:24 AM    SODIUM 140 01/09/2016 11:10 AM    POTASSIUM 4.9 06/07/2018 09:24 AM    POTASSIUM 4.3 01/09/2016 11:10 AM    CHLORIDE 101 06/07/2018 09:24 AM    CHLORIDE 108 01/09/2016 11:10 AM    CO2 23 06/07/2018 09:24 AM    CO2 22 01/09/2016 11:10 AM    GLUCOSE 194 (H) 06/07/2018 09:24 AM    GLUCOSE 158 (H) 01/09/2016 11:10 AM    BUN 11 06/07/2018 09:24 AM    BUN 17 01/09/2016 11:10 AM    CREATININE 0.91 06/07/2018 09:24 AM    CREATININE 0.89 01/09/2016 11:10 AM    CREATININE 1.1 07/21/2006 10:40 AM    BUNCREATRAT 12 06/07/2018 09:24 AM     Lab Results   Component Value Date/Time    ALKPHOSPHAT 54 06/07/2018 09:24 AM    ALKPHOSPHAT 65 01/09/2016 11:10 AM    ASTSGOT 34 06/07/2018 09:24 AM    ASTSGOT 37 01/09/2016 11:10 AM    ALTSGPT 35 06/07/2018 09:24 AM    " ALTSGPT 55 (H) 01/09/2016 11:10 AM    TBILIRUBIN 0.6 06/07/2018 09:24 AM    TBILIRUBIN 0.7 01/09/2016 11:10 AM      Lab Results   Component Value Date/Time    BNPBTYPENAT 15 02/17/2014 04:07 PM          Assessment:     1. Coronary artery disease due to calcified coronary lesion: MARYCARMEN to the circumflex in 2014     2. Mixed hyperlipidemia     3. Essential hypertension, benign         Medical Decision Making:  Today's Assessment / Status / Plan:     Mr. Wilks is having difficulty with arthralgias and myalgias which are very likely secondary to rosuvastatin therapy.  At this time, we will discontinue rosuvastatin.  His blood pressure is up today but has been under good control at home.  I would like him to check his blood pressures 3 times a week and bring in a record for our review when he follows up in about a month.  He will also have a repeat lipid panel about a month, off of statin therapy.  We will need to decide whether or not to try a lower dose statin and ezetimibe or to proceed to a PCSK-9 inhibitor.

## 2019-02-05 LAB
CHOLEST SERPL-MCNC: 202 MG/DL (ref 100–199)
HDLC SERPL-MCNC: 37 MG/DL
LABORATORY COMMENT REPORT: ABNORMAL
LDLC SERPL CALC-MCNC: 98 MG/DL (ref 0–99)
TRIGL SERPL-MCNC: 333 MG/DL (ref 0–149)
VLDLC SERPL CALC-MCNC: 67 MG/DL (ref 5–40)

## 2019-02-07 DIAGNOSIS — I10 HTN (HYPERTENSION), BENIGN: ICD-10-CM

## 2019-02-07 NOTE — TELEPHONE ENCOUNTER
*PT NEEDS TO MAKE AN APPT*  Was the patient seen in the last year in this department? NO    Does patient have an active prescription for medications requested? No     Received Request Via: Pharmacy      Pt met protocol?: No  BP Readings from Last 1 Encounters:   06/18/18 132/90     Lab Results   Component Value Date/Time    SODIUM 139 06/07/2018 09:24 AM    SODIUM 140 01/09/2016 11:10 AM    POTASSIUM 4.9 06/07/2018 09:24 AM    POTASSIUM 4.3 01/09/2016 11:10 AM    CHLORIDE 101 06/07/2018 09:24 AM    CHLORIDE 108 01/09/2016 11:10 AM    CO2 23 06/07/2018 09:24 AM    CO2 22 01/09/2016 11:10 AM    GLUCOSE 194 (H) 06/07/2018 09:24 AM    GLUCOSE 158 (H) 01/09/2016 11:10 AM    BUN 11 06/07/2018 09:24 AM    BUN 17 01/09/2016 11:10 AM    CREATININE 0.91 06/07/2018 09:24 AM    CREATININE 0.89 01/09/2016 11:10 AM    CREATININE 1.1 07/21/2006 10:40 AM    BUNCREATRAT 12 06/07/2018 09:24 AM

## 2019-02-08 RX ORDER — LISINOPRIL 5 MG/1
TABLET ORAL
Qty: 90 TAB | Refills: 0 | Status: SHIPPED | OUTPATIENT
Start: 2019-02-08 | End: 2019-06-06 | Stop reason: SDUPTHER

## 2019-02-08 NOTE — TELEPHONE ENCOUNTER
Last seen by PCP 1/18. Will send 3 months to pharmacy.Patient is due for an appointment. Please schedule.

## 2019-02-19 ENCOUNTER — TELEPHONE (OUTPATIENT)
Dept: CARDIOLOGY | Facility: MEDICAL CENTER | Age: 64
End: 2019-02-19

## 2019-02-19 NOTE — TELEPHONE ENCOUNTER
LIPID PANEL   Order: [id number]   Status:  Final result   Visible to patient:  Yes (MyChart)   Notes recorded by Sai Alexandra M.D. on 2/19/2019 at 7:35 AM PST  Patient was taken off of statin therapy last June and was supposed to follow-up in a month with a repeat lipid panel.  Patient needs a follow-up appointment.  ------    Notes recorded by Patricia Decker R.N. on 2/6/2019 at 5:00 PM PST  Last 2 appts cancelled  No FU scheduled  Schedulers notified     Pt called. Pt notified as above. Pt call transferred to a . Labs and above note also routed to pts PCP.

## 2019-02-25 ENCOUNTER — OFFICE VISIT (OUTPATIENT)
Dept: CARDIOLOGY | Facility: MEDICAL CENTER | Age: 64
End: 2019-02-25
Payer: COMMERCIAL

## 2019-02-25 VITALS
DIASTOLIC BLOOD PRESSURE: 78 MMHG | WEIGHT: 301 LBS | OXYGEN SATURATION: 93 % | HEIGHT: 70 IN | BODY MASS INDEX: 43.09 KG/M2 | HEART RATE: 72 BPM | SYSTOLIC BLOOD PRESSURE: 152 MMHG

## 2019-02-25 DIAGNOSIS — I25.84 CORONARY ARTERY DISEASE DUE TO CALCIFIED CORONARY LESION: ICD-10-CM

## 2019-02-25 DIAGNOSIS — I25.10 CORONARY ARTERY DISEASE DUE TO CALCIFIED CORONARY LESION: ICD-10-CM

## 2019-02-25 DIAGNOSIS — I10 ESSENTIAL HYPERTENSION, BENIGN: ICD-10-CM

## 2019-02-25 DIAGNOSIS — E78.2 MIXED HYPERLIPIDEMIA: ICD-10-CM

## 2019-02-25 PROCEDURE — 99214 OFFICE O/P EST MOD 30 MIN: CPT | Performed by: INTERNAL MEDICINE

## 2019-02-25 NOTE — PROGRESS NOTES
Chief Complaint   Patient presents with   • Coronary Artery Disease     F/V: 6 MO/LABS IN EPIC       Subjective:   Vitor Wilks is a 63 y.o. male who presents today for followup of his coronary artery disease with a drug-eluting stent placed in the circumflex for a non-ST segment elevation myocardial infarction in February 2014. He also has hypertension and hyperlipidemia.     He has had difficulty with both atorvastatin and rosuvastatin causing severe myalgias.    He denies any chest discomfort, dyspnea, palpitations or lightheadedness.  He occasionally notes some mild dependent edema.    He walks for an hour about 3-4 times weekly.            Past Medical History:   Diagnosis Date   • Heart attack (HCC) 02/17/     History reviewed. No pertinent surgical history.  Family History   Problem Relation Age of Onset   • Stroke Father 60        likely TIA     Social History     Social History   • Marital status:      Spouse name: N/A   • Number of children: N/A   • Years of education: N/A     Occupational History   • Not on file.     Social History Main Topics   • Smoking status: Former Smoker     Packs/day: 0.50     Years: 10.00     Types: Cigarettes     Quit date: 2/28/2008   • Smokeless tobacco: Never Used   • Alcohol use No   • Drug use: No   • Sexual activity: Not on file     Other Topics Concern   • Not on file     Social History Narrative   • No narrative on file     No Known Allergies  Outpatient Encounter Prescriptions as of 2/25/2019   Medication Sig Dispense Refill   • metoprolol SR (TOPROL XL) 50 MG TABLET SR 24 HR TAKE ONE TABLET BY MOUTH ONCE DAILY AT BEDTIME 90 Tab 0   • lisinopril (PRINIVIL) 5 MG Tab TAKE ONE TABLET BY MOUTH ONCE DAILY 90 Tab 0   • metformin (GLUCOPHAGE) 1000 MG tablet Take 1 Tab by mouth 2 times a day, with meals. 180 Tab 3   • aspirin (ASA) 325 MG TABS Take 325 mg by mouth every day.     • [DISCONTINUED] rosuvastatin (CRESTOR) 40 MG tablet Take 1 Tab by mouth every day.  "(Patient not taking: Reported on 2/25/2019) 90 Tab 3   • [DISCONTINUED] folic acid (FOLVITE) 1 MG Tab Take 1 mg by mouth every day.     • [DISCONTINUED] METHOTREXATE, ANTI-RHEUMATIC, PO Take  by mouth.       No facility-administered encounter medications on file as of 2/25/2019.      ROS     Objective:   /78 (BP Location: Left arm, Patient Position: Sitting, BP Cuff Size: Adult)   Pulse 72   Ht 1.778 m (5' 10\")   Wt (!) 136.5 kg (301 lb)   SpO2 93%   BMI 43.19 kg/m²     Physical Exam   Constitutional: He appears well-developed and well-nourished.   Neck: No JVD present.   Cardiovascular: Normal rate and regular rhythm.    No murmur heard.  Pulmonary/Chest: Effort normal and breath sounds normal. He has no rales.   Abdominal: Soft. There is no tenderness.   Musculoskeletal: He exhibits no edema.     Lab Results   Component Value Date/Time    CHOLSTRLTOT 202 (H) 02/04/2019 09:35 AM    CHOLSTRLTOT 187 01/09/2016 11:10 AM    LDL 98 02/04/2019 09:35 AM     (H) 01/09/2016 11:10 AM    HDL 37 (L) 02/04/2019 09:35 AM    HDL 36 (A) 01/09/2016 11:10 AM    TRIGLYCERIDE 333 (H) 02/04/2019 09:35 AM    TRIGLYCERIDE 238 (H) 01/09/2016 11:10 AM       Lab Results   Component Value Date/Time    SODIUM 139 06/07/2018 09:24 AM    SODIUM 140 01/09/2016 11:10 AM    POTASSIUM 4.9 06/07/2018 09:24 AM    POTASSIUM 4.3 01/09/2016 11:10 AM    CHLORIDE 101 06/07/2018 09:24 AM    CHLORIDE 108 01/09/2016 11:10 AM    CO2 23 06/07/2018 09:24 AM    CO2 22 01/09/2016 11:10 AM    GLUCOSE 194 (H) 06/07/2018 09:24 AM    GLUCOSE 158 (H) 01/09/2016 11:10 AM    BUN 11 06/07/2018 09:24 AM    BUN 17 01/09/2016 11:10 AM    CREATININE 0.91 06/07/2018 09:24 AM    CREATININE 0.89 01/09/2016 11:10 AM    CREATININE 1.1 07/21/2006 10:40 AM    BUNCREATRAT 12 06/07/2018 09:24 AM     Lab Results   Component Value Date/Time    ALKPHOSPHAT 54 06/07/2018 09:24 AM    ALKPHOSPHAT 65 01/09/2016 11:10 AM    ASTSGOT 34 06/07/2018 09:24 AM    ASTSGOT 37 " 01/09/2016 11:10 AM    ALTSGPT 35 06/07/2018 09:24 AM    ALTSGPT 55 (H) 01/09/2016 11:10 AM    TBILIRUBIN 0.6 06/07/2018 09:24 AM    TBILIRUBIN 0.7 01/09/2016 11:10 AM      Lab Results   Component Value Date/Time    BNPBTYPENAT 15 02/17/2014 04:07 PM          Assessment:     1. Coronary artery disease due to calcified coronary lesion: MARYCARMEN to the circumflex in 2014     2. Mixed hyperlipidemia     3. Essential hypertension, benign         Medical Decision Making:  Today's Assessment / Status / Plan:     Mr. Wilks is clinically stable.  His blood pressures been under fairly good control at home but is elevated today.  He relates this to the fact that he thought his appointment was scheduled at a different time.  In any event, we will have him bring in his blood pressure cuff for correlation with the wall manometer when he returns for follow-up.    He has not tolerated statin therapy and we will try to get him on a PC SK-9 inhibitor.  He will follow-up in a couple of months with lab work prior.

## 2019-02-26 ENCOUNTER — TELEPHONE (OUTPATIENT)
Dept: CARDIOLOGY | Facility: MEDICAL CENTER | Age: 64
End: 2019-02-26

## 2019-02-26 DIAGNOSIS — I25.10 CORONARY ARTERY DISEASE DUE TO CALCIFIED CORONARY LESION: ICD-10-CM

## 2019-02-26 DIAGNOSIS — E78.2 MIXED HYPERLIPIDEMIA: ICD-10-CM

## 2019-02-26 DIAGNOSIS — I21.4 NSTEMI (NON-ST ELEVATED MYOCARDIAL INFARCTION) (HCC): ICD-10-CM

## 2019-02-26 DIAGNOSIS — Z95.5 S/P CORONARY ARTERY STENT PLACEMENT: ICD-10-CM

## 2019-02-26 DIAGNOSIS — I25.84 CORONARY ARTERY DISEASE DUE TO CALCIFIED CORONARY LESION: ICD-10-CM

## 2019-02-27 NOTE — TELEPHONE ENCOUNTER
PAR sent to plan:  Vitor Wilks (Key: [id number])       [facility]   [facility]   Next Steps   The plan will send a determination in CoverMyMeds and via fax, typically within 1 to 5 business days.  How do I follow up?   DrugPraluent 75MG/ML pen-injectors   [facility] Commercial Medical Necessity Request FormMedical Necessity Request Form for [employer] [place] Commercial Members [phone]

## 2019-03-01 NOTE — TELEPHONE ENCOUNTER
Patient's plan is not approving patient's Praluent at this time, they are requesting that the patient trial Zetia for at least 12 consecutive weeks as adjunct to maximally tolerated statin therapy.

## 2019-03-04 RX ORDER — EZETIMIBE 10 MG/1
10 TABLET ORAL DAILY
Qty: 30 TAB | Refills: 1 | Status: SHIPPED | OUTPATIENT
Start: 2019-03-04 | End: 2019-05-03 | Stop reason: SDUPTHER

## 2019-03-04 NOTE — TELEPHONE ENCOUNTER
need added therapy   Received: Today   Message Contents   Carissa Jones, Med Ass't  Patriciaargentina Decker RDINORAH.             Patient is calling back regarding his Praluent PA, plan does require Zetia therapy I tried sending the message last week   Not sure if it was reviewed      Sai Alexandra M.D.PhysicianSigned Yesterday           Start Zetia 10 mg/d fu in 3 months with lipid panel. Pt is intolerant to statin therapy.                  Discussed with MD further.     Discussed with  Pharm tech.     Pt called. Discussed plan of care. Discussed medication action. Pt states understanding and agreeable. Appt re-scheduled for 5/30.

## 2019-05-03 DIAGNOSIS — E78.2 MIXED HYPERLIPIDEMIA: Primary | ICD-10-CM

## 2019-05-03 RX ORDER — EZETIMIBE 10 MG/1
TABLET ORAL
Qty: 90 TAB | Refills: 0 | Status: SHIPPED | OUTPATIENT
Start: 2019-05-03 | End: 2019-06-06 | Stop reason: SDUPTHER

## 2019-06-03 DIAGNOSIS — E78.2 MIXED HYPERLIPIDEMIA: ICD-10-CM

## 2019-06-03 DIAGNOSIS — I25.84 CORONARY ARTERY DISEASE DUE TO CALCIFIED CORONARY LESION: ICD-10-CM

## 2019-06-03 DIAGNOSIS — I25.10 CORONARY ARTERY DISEASE DUE TO CALCIFIED CORONARY LESION: ICD-10-CM

## 2019-06-03 DIAGNOSIS — I10 ESSENTIAL HYPERTENSION, BENIGN: ICD-10-CM

## 2019-06-06 DIAGNOSIS — I10 HTN (HYPERTENSION), BENIGN: ICD-10-CM

## 2019-06-06 DIAGNOSIS — E78.2 MIXED HYPERLIPIDEMIA: ICD-10-CM

## 2019-06-06 RX ORDER — EZETIMIBE 10 MG/1
10 TABLET ORAL DAILY
Qty: 90 TAB | Refills: 3 | Status: SHIPPED | OUTPATIENT
Start: 2019-06-06

## 2019-06-07 DIAGNOSIS — I10 HTN (HYPERTENSION), BENIGN: ICD-10-CM

## 2019-06-07 RX ORDER — LISINOPRIL 5 MG/1
5 TABLET ORAL DAILY
Qty: 90 TAB | Refills: 0 | Status: SHIPPED | OUTPATIENT
Start: 2019-06-07 | End: 2019-06-17 | Stop reason: SDUPTHER

## 2019-06-07 NOTE — TELEPHONE ENCOUNTER
From: Vitor Wilks  Sent: 6/6/2019 9:13 AM PDT  Subject: Medication Renewal Request    Vitor Wilks would like a refill of the following medications:     lisinopril (PRINIVIL) 5 MG Tab [Radha Parada M.D.]    Preferred pharmacy: [address]    Comment:      Medication renewals requested in this message routed separately:     ezetimibe (ZETIA) 10 MG Tab [Sai Alexandra M.D.]

## 2019-06-07 NOTE — TELEPHONE ENCOUNTER
Was the patient seen in the last year in this department? No     Does patient have an active prescription for medications requested? No     Received Request Via: Pharmacy      Pt met protocol?: No   Pt last ov 1/2018 has no upcoming appt    BP Readings from Last 1 Encounters:   02/25/19 152/78

## 2019-06-07 NOTE — TELEPHONE ENCOUNTER
Was the patient seen in the last year in this department? No     Does patient have an active prescription for medications requested? Yes    Received Request Via: Patient

## 2019-06-10 RX ORDER — LISINOPRIL 5 MG/1
TABLET ORAL
Qty: 90 TAB | Refills: 0 | OUTPATIENT
Start: 2019-06-10

## 2019-06-17 ENCOUNTER — OFFICE VISIT (OUTPATIENT)
Dept: CARDIOLOGY | Facility: MEDICAL CENTER | Age: 64
End: 2019-06-17
Payer: COMMERCIAL

## 2019-06-17 ENCOUNTER — TELEPHONE (OUTPATIENT)
Dept: CARDIOLOGY | Facility: MEDICAL CENTER | Age: 64
End: 2019-06-17

## 2019-06-17 VITALS
BODY MASS INDEX: 42.23 KG/M2 | HEIGHT: 70 IN | DIASTOLIC BLOOD PRESSURE: 66 MMHG | OXYGEN SATURATION: 94 % | HEART RATE: 64 BPM | WEIGHT: 295 LBS | SYSTOLIC BLOOD PRESSURE: 130 MMHG

## 2019-06-17 DIAGNOSIS — I25.84 CORONARY ARTERY DISEASE DUE TO CALCIFIED CORONARY LESION: Primary | ICD-10-CM

## 2019-06-17 DIAGNOSIS — I25.10 CORONARY ARTERY DISEASE DUE TO CALCIFIED CORONARY LESION: Primary | ICD-10-CM

## 2019-06-17 DIAGNOSIS — I10 ESSENTIAL HYPERTENSION, BENIGN: ICD-10-CM

## 2019-06-17 DIAGNOSIS — E78.2 MIXED HYPERLIPIDEMIA: ICD-10-CM

## 2019-06-17 PROCEDURE — 99214 OFFICE O/P EST MOD 30 MIN: CPT | Performed by: NURSE PRACTITIONER

## 2019-06-17 RX ORDER — LISINOPRIL 5 MG/1
5 TABLET ORAL DAILY
Qty: 90 TAB | Refills: 3 | Status: SHIPPED | OUTPATIENT
Start: 2019-06-17 | End: 2019-12-05

## 2019-06-17 RX ORDER — METOPROLOL SUCCINATE 50 MG/1
50 TABLET, EXTENDED RELEASE ORAL DAILY
Qty: 90 TAB | Refills: 3 | Status: SHIPPED | OUTPATIENT
Start: 2019-06-17

## 2019-06-17 RX ORDER — ASPIRIN 81 MG/1
81 TABLET, CHEWABLE ORAL DAILY
Qty: 100 TAB | Refills: 3 | COMMUNITY
Start: 2019-06-17

## 2019-06-17 ASSESSMENT — ENCOUNTER SYMPTOMS
ORTHOPNEA: 0
WEAKNESS: 0
ABDOMINAL PAIN: 0
PALPITATIONS: 0
SHORTNESS OF BREATH: 0
MYALGIAS: 0
PND: 0
DIZZINESS: 0
COUGH: 0
CLAUDICATION: 0

## 2019-06-17 NOTE — PROGRESS NOTES
"Chief Complaint   Patient presents with   • Coronary Artery Disease     follow up lab results       Subjective:   Vitor \"Joseph\"  Miguel Wilks is a 63 y.o. male who presents today to follow-up on coronary artery disease and hyperlipidemia.  He has a history of receiving stents to his circumflex and RCA back in February 2014.  He is a diabetic.    He did not tolerate statins and refuses to take them.  He is currently on Zetia 10 mg.  Apparently Praulent was declined by his insurance.  He is here to follow-up on his labs.    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  He states he is very active during the day and he also walks for exercise.  He denies any anginal symptoms.  He has not had any shortness of breath or palpitations.  He has some ankle edema in the afternoon.    Past Medical History:   Diagnosis Date   • CAD (coronary artery disease)    • Heart attack (HCC) 02/17/ Past Surgical History:   Procedure Laterality Date   • CARDIAC CATH, LEFT HEART  02/20/2014    BMS to RCA   • ZZZ CARDIAC CATH  2014    MARYCARMEN to Circ     Family History   Problem Relation Age of Onset   • Stroke Father 60        likely TIA     Social History     Social History   • Marital status:      Spouse name: N/A   • Number of children: N/A   • Years of education: N/A     Occupational History   • Not on file.     Social History Main Topics   • Smoking status: Former Smoker     Packs/day: 0.50     Years: 10.00     Types: Cigarettes     Quit date: 2/28/2008   • Smokeless tobacco: Never Used   • Alcohol use No   • Drug use: No   • Sexual activity: Not on file     Other Topics Concern   • Not on file     Social History Narrative   • No narrative on file     No Known Allergies  Outpatient Encounter Prescriptions as of 6/17/2019   Medication Sig Dispense Refill   • aspirin (ASA) 81 MG Chew Tab chewable tablet Take 1 Tab by mouth every day. 100 Tab 3   • lisinopril (PRINIVIL) 5 MG Tab Take 1 Tab by mouth every day. 90 Tab 3   • " "metoprolol SR (TOPROL XL) 50 MG TABLET SR 24 HR Take 1 Tab by mouth every day. 90 Tab 3   • choline fenofibrate (TRILIPIX) 135 MG CAPSULE DELAYED RELEASE Take 1 Cap by mouth every day. 90 Tab 3   • ezetimibe (ZETIA) 10 MG Tab Take 1 Tab by mouth every day. 90 Tab 3   • metformin (GLUCOPHAGE) 1000 MG tablet TAKE ONE TABLET BY MOUTH TWICE DAILY WITH MEALS 180 Tab 0   • [DISCONTINUED] lisinopril (PRINIVIL) 5 MG Tab Take 1 Tab by mouth every day. 90 Tab 0   • [DISCONTINUED] metoprolol SR (TOPROL XL) 50 MG TABLET SR 24 HR TAKE 1 TABLET BY MOUTH ONCE DAILY AT BEDTIME *NEED  APPOINTMENT  FOR  FURTHER  REFILLS* 30 Tab 0   • [DISCONTINUED] aspirin (ASA) 325 MG TABS Take 325 mg by mouth every day.       No facility-administered encounter medications on file as of 6/17/2019.      Review of Systems   Constitutional: Negative for malaise/fatigue.   Respiratory: Negative for cough and shortness of breath.    Cardiovascular: Negative for chest pain, palpitations, orthopnea, claudication, leg swelling and PND.   Gastrointestinal: Negative for abdominal pain.   Musculoskeletal: Negative for myalgias.   Neurological: Negative for dizziness and weakness.        Objective:   /66 (BP Location: Left arm, Patient Position: Sitting)   Pulse 64   Ht 1.778 m (5' 10\")   Wt (!) 133.8 kg (295 lb)   SpO2 94%   BMI 42.33 kg/m²     Physical Exam   Constitutional: He is oriented to person, place, and time. He appears well-developed and well-nourished.   HENT:   Head: Normocephalic.   Eyes: EOM are normal.   Neck: Normal range of motion. No JVD present.   Cardiovascular: Normal rate and regular rhythm.  Exam reveals no friction rub.    No murmur heard.  Pulmonary/Chest: Effort normal.   Abdominal: Soft. Bowel sounds are normal.   Central obesity.   Musculoskeletal: He exhibits no edema.   Neurological: He is alert and oriented to person, place, and time.   Skin: Skin is warm and dry.   Psychiatric: He has a normal mood and affect.     May " 30, 2019: Comprehensive metabolic panels within normal limits with exception of fasting blood sugar 174.  Lipids: Cholesterol 181, triglycerides 329, HDL 46, LDL 91.    Assessment:     1. Coronary artery disease due to calcified coronary lesion: MARYCARMEN to the circumflex in 2014  aspirin (ASA) 81 MG Chew Tab chewable tablet    metoprolol SR (TOPROL XL) 50 MG TABLET SR 24 HR   2. Essential hypertension, benign  lisinopril (PRINIVIL) 5 MG Tab   3. Mixed hyperlipidemia  choline fenofibrate (TRILIPIX) 135 MG CAPSULE DELAYED RELEASE    LIPID PANEL    Comp Metabolic Panel       Medical Decision Making:  Today's Assessment / Status / Plan:     Coronary artery disease: Status post stenting to the circumflex and RCA back in 2014.  No anginal symptoms.  He has been on aspirin 325 mg which is no longer the recommendation.  I will change him to aspirin 81 mg.    Hypertension: His blood pressure is controlled.  Continue lisinopril.    Hyperlipidemia: His LDL is 91 and not to goal.  However he refuses statins and will not even consider them.  We will have him continue on Zetia and reduce saturated fats in his diet.  His triglycerides have been running in the 300's for several years.  I counseled him on reducing total calories especially carbs in his diet.  I recommend he start taking fish oil twice a day.  Since he is not on a statin and there will not be an interaction I will start fenofibrate 135 mg daily.  We will check lipids and metabolic panel in 3 months.    He is a diabetic and I encouraged him to get his diabetes under better control.    He will follow-up in 6 months with Dr. Alexandra, sooner if problems.    Collaborating Provider: Dr. Otilio Jolley.    Please note that this dictation was created using voice recognition software. I have made every reasonable attempt to correct obvious errors, but it is possible there are errors of grammar and possibly content that I did not discover before finalizing the note.

## 2019-06-21 ENCOUNTER — TELEPHONE (OUTPATIENT)
Dept: CARDIOLOGY | Facility: MEDICAL CENTER | Age: 64
End: 2019-06-21

## 2019-06-21 DIAGNOSIS — E78.2 MIXED HYPERLIPIDEMIA: ICD-10-CM

## 2019-06-21 RX ORDER — FENOFIBRATE 160 MG/1
160 TABLET ORAL DAILY
Qty: 90 TAB | Refills: 3 | Status: SHIPPED | OUTPATIENT
Start: 2019-06-21 | End: 2019-12-05

## 2019-06-21 NOTE — TELEPHONE ENCOUNTER
Medication list updated.  Fenofibrate 160mg sent to pt preferred pharmacy.    Called patient, unable to reach.  Left voicemail regarding MD recommendations and to return this call if he has any questions or concerns.    ------------------   Notice:   Scan on 6/18/2019  1:27 PM by Deanna Bar : 6/17/2019 -  RX DESK (REQUEST DENIED)     RE: Edit   Received: 2 days ago Message Contents   NERI Wright R.N.     Change prescription to fenofibrate 160 mg daily 90 with 3 refills.  Tell the patient that it is essentially the same.      ----- Message -----   From: Alyssa Muñoz R.N.   Sent: 6/18/2019   4:49 PM   To: ENRI Wright   Subject: FW: Edit                                         Please advise     Fenofibrate 135mg is denied.       Pt plan covers Fenofibrate 54mg and 160mg capsule.     Thank you!     ----- Message -----   From: Deanna Bar   Sent: 6/18/2019   1:27 PM   To: Alyssa Muñoz R.N.   Subject: Edit                                             The scan below was edited by Deanna Bar [[id number]] on 6/18/2019 at 1:27 PM; it is attached to the following: Vitor Wilks [[id number]].

## 2019-06-29 DIAGNOSIS — E11.9 TYPE 2 DIABETES MELLITUS WITHOUT COMPLICATION, WITHOUT LONG-TERM CURRENT USE OF INSULIN (HCC): ICD-10-CM

## 2019-07-01 NOTE — TELEPHONE ENCOUNTER
Was the patient seen in the last year in this department? No    Does patient have an active prescription for medications requested? No     Received Request Via: Patient      Pt met protocol?: No, last ov 1/18  No recent labs  No appointment scheduled   Lab Results   Component Value Date/Time    HBA1C 6.7 07/31/2017 09:30 AM

## 2019-07-01 NOTE — TELEPHONE ENCOUNTER
Pt was told 2/19 and 6/19 to make appt and that has not been done. Please advise pt only 30 days will be sent to pharmacy and next request will be denied.

## 2019-08-07 ENCOUNTER — OFFICE VISIT (OUTPATIENT)
Dept: MEDICAL GROUP | Facility: PHYSICIAN GROUP | Age: 64
End: 2019-08-07
Payer: COMMERCIAL

## 2019-08-07 VITALS
WEIGHT: 298 LBS | SYSTOLIC BLOOD PRESSURE: 126 MMHG | OXYGEN SATURATION: 94 % | TEMPERATURE: 98.4 F | BODY MASS INDEX: 41.72 KG/M2 | RESPIRATION RATE: 16 BRPM | HEIGHT: 71 IN | DIASTOLIC BLOOD PRESSURE: 84 MMHG | HEART RATE: 64 BPM

## 2019-08-07 DIAGNOSIS — Z23 NEED FOR VACCINATION: ICD-10-CM

## 2019-08-07 DIAGNOSIS — E78.2 MIXED HYPERLIPIDEMIA: ICD-10-CM

## 2019-08-07 DIAGNOSIS — I25.10 CORONARY ARTERY DISEASE DUE TO CALCIFIED CORONARY LESION: ICD-10-CM

## 2019-08-07 DIAGNOSIS — E66.01 MORBID OBESITY WITH BMI OF 40.0-44.9, ADULT (HCC): ICD-10-CM

## 2019-08-07 DIAGNOSIS — E11.8 CONTROLLED TYPE 2 DIABETES MELLITUS WITH COMPLICATION, WITHOUT LONG-TERM CURRENT USE OF INSULIN (HCC): ICD-10-CM

## 2019-08-07 DIAGNOSIS — E11.9 TYPE 2 DIABETES MELLITUS WITHOUT COMPLICATION, WITHOUT LONG-TERM CURRENT USE OF INSULIN (HCC): ICD-10-CM

## 2019-08-07 DIAGNOSIS — Z12.11 COLON CANCER SCREENING: ICD-10-CM

## 2019-08-07 DIAGNOSIS — Z11.59 ENCOUNTER FOR HEPATITIS C SCREENING TEST FOR LOW RISK PATIENT: ICD-10-CM

## 2019-08-07 DIAGNOSIS — I25.84 CORONARY ARTERY DISEASE DUE TO CALCIFIED CORONARY LESION: ICD-10-CM

## 2019-08-07 LAB
HBA1C MFR BLD: 7.8 % (ref 0–5.6)
INT CON NEG: ABNORMAL
INT CON POS: ABNORMAL

## 2019-08-07 PROCEDURE — 99214 OFFICE O/P EST MOD 30 MIN: CPT | Mod: 25 | Performed by: FAMILY MEDICINE

## 2019-08-07 PROCEDURE — 90471 IMMUNIZATION ADMIN: CPT | Performed by: FAMILY MEDICINE

## 2019-08-07 PROCEDURE — 92250 FUNDUS PHOTOGRAPHY W/I&R: CPT | Mod: TC | Performed by: FAMILY MEDICINE

## 2019-08-07 PROCEDURE — 83036 HEMOGLOBIN GLYCOSYLATED A1C: CPT | Performed by: FAMILY MEDICINE

## 2019-08-07 PROCEDURE — 90715 TDAP VACCINE 7 YRS/> IM: CPT | Performed by: FAMILY MEDICINE

## 2019-08-07 ASSESSMENT — PATIENT HEALTH QUESTIONNAIRE - PHQ9: CLINICAL INTERPRETATION OF PHQ2 SCORE: 0

## 2019-08-07 NOTE — ASSESSMENT & PLAN NOTE
Chronic condition stable clinically on current medical management patient is on aspirin, metoprolol, lisinopril.  Intolerant to statin continues on Zetia and fenofibrate.  Completed the research study to see if methotrexate would reduce inflammation and therefore reduce risk of cardiac events patient notes that they did find no benefit.  Patient continues to be asymptomatic with no chest pain palpitations or shortness of breath.  Continues follow-up with his cardiologist as scheduled.

## 2019-08-07 NOTE — ASSESSMENT & PLAN NOTE
Patient has chronic diabetes type 2.  He checks his blood sugars occasionally they are sometimes above 150 but no longer go above 200.  Patient does have an eye specialist he sees once a year.  I encouraged him to continue with annual retinal screenings.  Patient has been working on weight loss.  I encouraged him to continue with healthy diet with plenty of vegetables low in sugars and starches and adequate protein.  Encouraged him to continue with regular exercise most days of the week to improve blood sugars and help with weight loss.      Diabetic foot exam done today. Normal monofilament testing.       Patient's labs ordered for December when he will have other labs done by his cardiologist.  Patient is appropriately on metformin 1000 mg twice daily.  He is also on aspirin 81 mg and lisinopril 5 mg last labs showed normal renal function.  Patient had myalgia with statins is therefore on Zetia and fenofibrate.

## 2019-08-07 NOTE — PROGRESS NOTES
Subjective:   Vitor Wilks is a 63 y.o. male here today for evaluation and management of:     Controlled diabetes mellitus type 2 with complications (CMS-[facility])  Patient has chronic diabetes type 2.  He checks his blood sugars occasionally they are sometimes above 150 but no longer go above 200.  Patient does have an eye specialist he sees once a year.  I encouraged him to continue with annual retinal screenings.  Patient has been working on weight loss.  I encouraged him to continue with healthy diet with plenty of vegetables low in sugars and starches and adequate protein.  Encouraged him to continue with regular exercise most days of the week to improve blood sugars and help with weight loss.      Diabetic foot exam done today. Normal monofilament testing.       Patient's labs ordered for December when he will have other labs done by his cardiologist.  Patient is appropriately on metformin 1000 mg twice daily.  He is also on aspirin 81 mg and lisinopril 5 mg last labs showed normal renal function.  Patient had myalgia with statins is therefore on Zetia and fenofibrate.    Morbid obesity with BMI of 40.0-44.9, adult ([facility])  Encourage healthy diet regular activity for gradual weight loss    Mixed hyperlipidemia  Patient is intolerant to statins due to myalgia.  Continues on fenofibrate and Zetia.  Repeat labs have been ordered by his cardiologist.    Coronary artery disease due to calcified coronary lesion: MARYCARMEN to the circumflex in 2014  Chronic condition stable clinically on current medical management patient is on aspirin, metoprolol, lisinopril.  Intolerant to statin continues on Zetia and fenofibrate.  Completed the research study to see if methotrexate would reduce inflammation and therefore reduce risk of cardiac events patient notes that they did find no benefit.  Patient continues to be asymptomatic with no chest pain palpitations or shortness of breath.  Continues follow-up with his cardiologist as  "scheduled.         Current medicines (including changes today)  Current Outpatient Medications   Medication Sig Dispense Refill   • metformin (GLUCOPHAGE) 1000 MG tablet Take 1 Tab by mouth 2 times a day, with meals. 180 Tab 3   • fenofibrate (TRIGLIDE) 160 MG tablet Take 1 Tab by mouth every day. 90 Tab 3   • aspirin (ASA) 81 MG Chew Tab chewable tablet Take 1 Tab by mouth every day. 100 Tab 3   • lisinopril (PRINIVIL) 5 MG Tab Take 1 Tab by mouth every day. 90 Tab 3   • metoprolol SR (TOPROL XL) 50 MG TABLET SR 24 HR Take 1 Tab by mouth every day. 90 Tab 3   • ezetimibe (ZETIA) 10 MG Tab Take 1 Tab by mouth every day. 90 Tab 3     No current facility-administered medications for this visit.      He  has a past medical history of CAD (coronary artery disease) and Heart attack (HCC) (02/17/).    ROS  No chest pain, no shortness of breath, no abdominal pain       Objective:     /84 (BP Location: Right arm, Patient Position: Sitting, BP Cuff Size: Large adult)   Pulse 64   Temp 36.9 °C (98.4 °F) (Temporal)   Resp 16   Ht 1.803 m (5' 11\")   Wt (!) 135.2 kg (298 lb)   SpO2 94%  Body mass index is 41.56 kg/m².   Physical Exam:  Constitutional: Alert, no distress.  Skin: Warm, dry, good turgor, no rashes in visible areas.  Eye: Equal, round and reactive, conjunctiva clear, lids normal.  ENMT: Lips without lesions, good dentition, oropharynx clear.  Neck: Trachea midline, no masses, no thyromegaly. No cervical or supraclavicular lymphadenopathy  Respiratory: Unlabored respiratory effort, lungs clear to auscultation, no wheezes, no ronchi.  Cardiovascular: Normal S1, S2, no murmur, no edema.  Abdomen: Soft, non-tender, no masses, no hepatosplenomegaly.  Psych: Alert and oriented x3, normal affect and mood.        Assessment and Plan:   The following treatment plan was discussed    1. Type 2 diabetes mellitus without complication, without long-term current use of insulin (HCC)  A1c 7.8 done in clinic today.  " Patient advised on regular exercise, diet changes and weight loss for improving blood sugars.  He does not want to add any medication to the current regimen  - POCT Retinal Eye Exam  - POCT Hemoglobin A1C  - Diabetic Monofilament Lower Extremity Exam  - MICROALBUMIN CREAT RATIO URINE (LAB COLLECT); Future  - metformin (GLUCOPHAGE) 1000 MG tablet; Take 1 Tab by mouth 2 times a day, with meals.  Dispense: 180 Tab; Refill: 3    2. Colon cancer screening  - OCCULT BLOOD FECES IMMUNOASSAY; Future    3. Need for vaccination  - TDAP VACCINE =>8YO IM    4. Encounter for hepatitis C screening test for low risk patient  - HEP C VIRUS ANTIBODY; Future    5. Morbid obesity with BMI of 40.0-44.9, adult (HCC)  - Patient identified as having weight management issue.  Appropriate orders and counseling given.    6. Mixed hyperlipidemia  Continue Zetia and fenofibrate    7. Coronary artery disease due to calcified coronary lesion: MARYCARMEN to the circumflex in 2014  Stable on current medical management.  Encouraged weight loss and regular exercise.      Followup: Return in about 6 months (around 2/7/2020) for DM, HTN, CAD.

## 2019-08-07 NOTE — ASSESSMENT & PLAN NOTE
Patient is intolerant to statins due to myalgia.  Continues on fenofibrate and Zetia.  Repeat labs have been ordered by his cardiologist.

## 2019-11-27 ENCOUNTER — TELEPHONE (OUTPATIENT)
Dept: CARDIOLOGY | Facility: MEDICAL CENTER | Age: 64
End: 2019-11-27

## 2019-12-03 ENCOUNTER — HOSPITAL ENCOUNTER (OUTPATIENT)
Dept: LAB | Facility: MEDICAL CENTER | Age: 64
End: 2019-12-03
Attending: INTERNAL MEDICINE
Payer: MEDICARE

## 2019-12-03 DIAGNOSIS — I25.84 CORONARY ARTERY DISEASE DUE TO CALCIFIED CORONARY LESION: ICD-10-CM

## 2019-12-03 DIAGNOSIS — E78.2 MIXED HYPERLIPIDEMIA: ICD-10-CM

## 2019-12-03 DIAGNOSIS — I25.10 CORONARY ARTERY DISEASE DUE TO CALCIFIED CORONARY LESION: ICD-10-CM

## 2019-12-03 LAB
CHOLEST SERPL-MCNC: 166 MG/DL (ref 100–199)
FASTING STATUS PATIENT QL REPORTED: NORMAL
HDLC SERPL-MCNC: 40 MG/DL
LDLC SERPL CALC-MCNC: ABNORMAL MG/DL
TRIGL SERPL-MCNC: 425 MG/DL (ref 0–149)

## 2019-12-03 PROCEDURE — 80061 LIPID PANEL: CPT

## 2019-12-03 PROCEDURE — 36415 COLL VENOUS BLD VENIPUNCTURE: CPT

## 2019-12-05 ENCOUNTER — OFFICE VISIT (OUTPATIENT)
Dept: CARDIOLOGY | Facility: MEDICAL CENTER | Age: 64
End: 2019-12-05
Payer: MEDICARE

## 2019-12-05 VITALS
HEIGHT: 71 IN | SYSTOLIC BLOOD PRESSURE: 144 MMHG | HEART RATE: 78 BPM | WEIGHT: 300 LBS | DIASTOLIC BLOOD PRESSURE: 88 MMHG | OXYGEN SATURATION: 92 % | BODY MASS INDEX: 42 KG/M2

## 2019-12-05 DIAGNOSIS — I10 ESSENTIAL HYPERTENSION, BENIGN: ICD-10-CM

## 2019-12-05 DIAGNOSIS — I25.84 CORONARY ARTERY DISEASE DUE TO CALCIFIED CORONARY LESION: ICD-10-CM

## 2019-12-05 DIAGNOSIS — E78.2 MIXED HYPERLIPIDEMIA: ICD-10-CM

## 2019-12-05 DIAGNOSIS — I25.10 CORONARY ARTERY DISEASE DUE TO CALCIFIED CORONARY LESION: ICD-10-CM

## 2019-12-05 PROCEDURE — 99214 OFFICE O/P EST MOD 30 MIN: CPT | Performed by: INTERNAL MEDICINE

## 2019-12-05 RX ORDER — OMEGA-3-ACID ETHYL ESTERS 1 G/1
2 CAPSULE, LIQUID FILLED ORAL 2 TIMES DAILY
Qty: 120 CAP | Refills: 11 | Status: SHIPPED | OUTPATIENT
Start: 2019-12-05

## 2019-12-05 RX ORDER — ATORVASTATIN CALCIUM 20 MG/1
20 TABLET, FILM COATED ORAL DAILY
Qty: 30 TAB | Refills: 11 | Status: SHIPPED | OUTPATIENT
Start: 2019-12-05 | End: 2019-12-06 | Stop reason: SDUPTHER

## 2019-12-05 RX ORDER — LISINOPRIL 20 MG/1
20 TABLET ORAL DAILY
Qty: 30 TAB | Refills: 11 | Status: SHIPPED | OUTPATIENT
Start: 2019-12-05 | End: 2019-12-06 | Stop reason: SDUPTHER

## 2019-12-06 DIAGNOSIS — E78.2 MIXED HYPERLIPIDEMIA: ICD-10-CM

## 2019-12-06 DIAGNOSIS — I10 ESSENTIAL HYPERTENSION, BENIGN: ICD-10-CM

## 2019-12-06 RX ORDER — ATORVASTATIN CALCIUM 20 MG/1
20 TABLET, FILM COATED ORAL DAILY
Qty: 100 TAB | Refills: 3 | Status: SHIPPED | OUTPATIENT
Start: 2019-12-06

## 2019-12-06 RX ORDER — LISINOPRIL 20 MG/1
20 TABLET ORAL DAILY
Qty: 100 TAB | Refills: 3 | Status: SHIPPED | OUTPATIENT
Start: 2019-12-06

## 2019-12-10 DIAGNOSIS — E11.9 TYPE 2 DIABETES MELLITUS WITHOUT COMPLICATION, WITHOUT LONG-TERM CURRENT USE OF INSULIN (HCC): ICD-10-CM

## 2022-10-26 NOTE — PROGRESS NOTES
Chief Complaint   Patient presents with   • Coronary Artery Disease     FV       Subjective:   Vitor Wilks is a 63 y.o. male who presents today for followup of his coronary artery disease with a drug-eluting stent placed in the circumflex for a non-ST segment elevation myocardial infarction in 2014. He also has hypertension and hyperlipidemia.     He has had difficulty with rosuvastatin causing severe myalgias.  He previously was on atorvastatin and actually had no issue with that.  He was on rosuvastatin for a year before he developed a severe arthralgias and myalgias.    He denies any chest discomfort, dyspnea, palpitations or lightheadedness.  He occasionally notes some mild dependent edema.    He walks for at least an hour about 3-4 times weekly.            Past Medical History:   Diagnosis Date   • CAD (coronary artery disease)    • Heart attack (HCC) /      Surgical History:   Procedure Laterality Date   • CARDIAC CATH, LEFT HEART  2014    BMS to RCA   • ZZZ CARDIAC CATH      MARYCARMEN to Circ     Family History   Problem Relation Age of Onset   • Stroke Father 60        likely TIA     Social History     Socioeconomic History   • Marital status:      Spouse name: Not on file   • Number of children: Not on file   • Years of education: Not on file   • Highest education level: Not on file   Occupational History   • Not on file   Social Needs   • Financial resource strain: Not on file   • Food insecurity:     Worry: Not on file     Inability: Not on file   • Transportation needs:     Medical: Not on file     Non-medical: Not on file   Tobacco Use   • Smoking status: Former Smoker     Packs/day: 0.50     Years: 10.00     Pack years: 5.00     Types: Cigarettes     Last attempt to quit: 2008     Years since quittin.7   • Smokeless tobacco: Never Used   Substance and Sexual Activity   • Alcohol use: No   • Drug use: No   • Sexual activity: Not on file   Lifestyle   •  "Physical activity:     Days per week: Not on file     Minutes per session: Not on file   • Stress: Not on file   Relationships   • Social connections:     Talks on phone: Not on file     Gets together: Not on file     Attends [employer] service: Not on file     Active member of club or organization: Not on file     Attends meetings of clubs or organizations: Not on file     Relationship status: Not on file   • Intimate partner violence:     Fear of current or ex partner: Not on file     Emotionally abused: Not on file     Physically abused: Not on file     Forced sexual activity: Not on file   Other Topics Concern   • Not on file   Social History Narrative   • Not on file     No Known Allergies  Outpatient Encounter Medications as of 12/5/2019   Medication Sig Dispense Refill   • metformin (GLUCOPHAGE) 1000 MG tablet Take 1 Tab by mouth 2 times a day, with meals. 180 Tab 3   • aspirin (ASA) 81 MG Chew Tab chewable tablet Take 1 Tab by mouth every day. 100 Tab 3   • lisinopril (PRINIVIL) 5 MG Tab Take 1 Tab by mouth every day. 90 Tab 3   • metoprolol SR (TOPROL XL) 50 MG TABLET SR 24 HR Take 1 Tab by mouth every day. 90 Tab 3   • ezetimibe (ZETIA) 10 MG Tab Take 1 Tab by mouth every day. 90 Tab 3   • fenofibrate (TRIGLIDE) 160 MG tablet Take 1 Tab by mouth every day. (Patient not taking: Reported on 12/5/2019) 90 Tab 3     No facility-administered encounter medications on file as of 12/5/2019.      ROS       Objective:   /88 (BP Location: Left arm, Patient Position: Sitting, BP Cuff Size: Adult)   Pulse 78   Ht 1.803 m (5' 11\")   Wt (!) 136.1 kg (300 lb)   SpO2 92%   BMI 41.84 kg/m²     Physical Exam   Constitutional: He appears well-developed and well-nourished.   Neck: No JVD present.   Cardiovascular: Normal rate and regular rhythm.   No murmur heard.  Pulmonary/Chest: Effort normal and breath sounds normal. He has no rales.   Abdominal: Soft. There is no tenderness.   Musculoskeletal:         General: " No edema.     Lab Results   Component Value Date/Time    CHOLSTRLTOT 166 12/03/2019 06:37 AM    LDL see below 12/03/2019 06:37 AM    HDL 40 12/03/2019 06:37 AM    TRIGLYCERIDE 425 (H) 12/03/2019 06:37 AM       Lab Results   Component Value Date/Time    SODIUM 139 06/07/2018 09:24 AM    SODIUM 140 01/09/2016 11:10 AM    POTASSIUM 4.9 06/07/2018 09:24 AM    POTASSIUM 4.3 01/09/2016 11:10 AM    CHLORIDE 101 06/07/2018 09:24 AM    CHLORIDE 108 01/09/2016 11:10 AM    CO2 23 06/07/2018 09:24 AM    CO2 22 01/09/2016 11:10 AM    GLUCOSE 194 (H) 06/07/2018 09:24 AM    GLUCOSE 158 (H) 01/09/2016 11:10 AM    BUN 11 06/07/2018 09:24 AM    BUN 17 01/09/2016 11:10 AM    CREATININE 0.91 06/07/2018 09:24 AM    CREATININE 0.89 01/09/2016 11:10 AM    CREATININE 1.1 07/21/2006 10:40 AM    BUNCREATRAT 12 06/07/2018 09:24 AM     Lab Results   Component Value Date/Time    ALKPHOSPHAT 54 06/07/2018 09:24 AM    ALKPHOSPHAT 65 01/09/2016 11:10 AM    ASTSGOT 34 06/07/2018 09:24 AM    ASTSGOT 37 01/09/2016 11:10 AM    ALTSGPT 35 06/07/2018 09:24 AM    ALTSGPT 55 (H) 01/09/2016 11:10 AM    TBILIRUBIN 0.6 06/07/2018 09:24 AM    TBILIRUBIN 0.7 01/09/2016 11:10 AM      Lab Results   Component Value Date/Time    BNPBTYPENAT 15 02/17/2014 04:07 PM          Assessment:     1. Coronary artery disease due to calcified coronary lesion: MARYCARMEN to the circumflex in 2014     2. Mixed hyperlipidemia     3. Essential hypertension, benign         Medical Decision Making:  Today's Assessment / Status / Plan:     Mr. Wilks is clinically stable.  Since he tolerated atorvastatin in the past we will try placing him back on 20 mg daily.  We will continue ezetimibe.  Given his hypertriglyceridemia, we will place him on omega-3 fatty acids.  He will have lab work in about 6 weeks and follow-up in a couple of months.  At that time, we will could consider increasing the atorvastatin if he is tolerating this medication.    His diabetes has not been under good control  and this is considered contributing to his hypertriglyceridemia.  I asked him to talk with his PCP about the possibility of starting Jardiance.    His blood pressure is not under optimal control and we will increase lisinopril to 20 mg daily.   Yes

## 2023-07-12 NOTE — TELEPHONE ENCOUNTER
PAR sent to patient's plan:  Vitor Wilks (Key: [id number])   Need help? Call us at [phone]     Status   Sent to [facility]   Next Steps   The plan will fax you a determination, typically within 1 to 5 business days.  How do I follow up?   DrugFenofibric Acid 135MG dr capsules   [place] Medicaid Medical Necessity Request FormMedical Necessity Request Form for [employer] [place] Medicaid Members [phone]      
No

## 2024-10-02 NOTE — TELEPHONE ENCOUNTER
Was the patient seen in the last year in this department? Yes    Does patient have an active prescription for medications requested? No     Received Request Via: Pharmacy      Pt met protocol?: Yes, labs and ov 8/19   Contains abnormal data POCT Hemoglobin A1C   Order: [id number]   Status:  Final result   Visible to patient:  Yes (MyChart)   Next appt:  02/26/2020 at 07:40 [facility] (Radha Parada M.D.)   Dx:  Type 2 diabetes mellitus without comp...    Ref Range & Units 4mo ago   Glycohemoglobin 0.0 - 5.6 % 7.8Abnormal     Internal Control Negative  Valid    Internal Control Positive             100ds   35